# Patient Record
Sex: FEMALE | Race: WHITE | NOT HISPANIC OR LATINO | Employment: UNEMPLOYED | URBAN - METROPOLITAN AREA
[De-identification: names, ages, dates, MRNs, and addresses within clinical notes are randomized per-mention and may not be internally consistent; named-entity substitution may affect disease eponyms.]

---

## 2021-05-30 ENCOUNTER — RECORDS - HEALTHEAST (OUTPATIENT)
Dept: ADMINISTRATIVE | Facility: CLINIC | Age: 61
End: 2021-05-30

## 2021-05-31 ENCOUNTER — RECORDS - HEALTHEAST (OUTPATIENT)
Dept: ADMINISTRATIVE | Facility: CLINIC | Age: 61
End: 2021-05-31

## 2021-06-01 ENCOUNTER — RECORDS - HEALTHEAST (OUTPATIENT)
Dept: ADMINISTRATIVE | Facility: CLINIC | Age: 61
End: 2021-06-01

## 2021-06-02 ENCOUNTER — RECORDS - HEALTHEAST (OUTPATIENT)
Dept: ADMINISTRATIVE | Facility: CLINIC | Age: 61
End: 2021-06-02

## 2022-04-07 ENCOUNTER — MEDICAL CORRESPONDENCE (OUTPATIENT)
Dept: HEALTH INFORMATION MANAGEMENT | Facility: CLINIC | Age: 62
End: 2022-04-07
Payer: COMMERCIAL

## 2022-04-07 ENCOUNTER — TRANSFERRED RECORDS (OUTPATIENT)
Dept: HEALTH INFORMATION MANAGEMENT | Facility: CLINIC | Age: 62
End: 2022-04-07
Payer: COMMERCIAL

## 2022-04-08 ENCOUNTER — TRANSCRIBE ORDERS (OUTPATIENT)
Dept: OTHER | Age: 62
End: 2022-04-08
Payer: COMMERCIAL

## 2022-04-08 DIAGNOSIS — H53.10 SUBJECTIVE VISION DISTURBANCE, LEFT EYE: Primary | ICD-10-CM

## 2022-04-11 ENCOUNTER — TELEPHONE (OUTPATIENT)
Dept: OPHTHALMOLOGY | Facility: CLINIC | Age: 62
End: 2022-04-11
Payer: COMMERCIAL

## 2022-04-11 NOTE — TELEPHONE ENCOUNTER
Hey,     They are requesting Dr. Pugh to see this pt in one week     Notes were sent to you 4/8     Thanks     Victoria

## 2022-04-11 NOTE — TELEPHONE ENCOUNTER
[facility] Call Center    Phone Message    May a detailed message be left on voicemail: yes     Reason for Call: Appointment Intake    Referring Provider Name: Dr. Yen Hammond @ [facility]  Diagnosis and/or Symptoms: Subjective vision disturbance, left eye    Please advise     Action Taken: Message routed to:  [facility] ([facility] eye     Travel Screening: Not Applicable

## 2022-04-11 NOTE — TELEPHONE ENCOUNTER
Called and LVM for Ella fong make an appt with Dr. Pugh for Can do 4/21 at 7:45 am at [facility] with Dr. Keaton Cobos Communication Facilitator on 4/11/2022 at 12:52 PM

## 2022-04-12 ENCOUNTER — TELEPHONE (OUTPATIENT)
Dept: OPHTHALMOLOGY | Facility: CLINIC | Age: 62
End: 2022-04-12
Payer: COMMERCIAL

## 2022-04-12 NOTE — TELEPHONE ENCOUNTER
Ella called me and LVM confirming the date and time worked for her.     Made her an appointment with Dr. Pugh for 4/21 @ 745 am @ [facility]     Unable to reach Ella to enter her insurance, but provided the clinic address on her VM     Sent out a new pt packet with time, date of appointment     Victoria Cobos Communication Facilitator on 4/12/2022 at 9:21 AM

## 2022-04-12 NOTE — TELEPHONE ENCOUNTER
Called and LVM for Ella Jaramillo can make an appt with Dr. Pugh for Can do 4/21 at 7:45 am at [facility] with Dr. Pugh      Provided my number to confirm if that for  time, and date works     Victoria Cobos Communication Facilitator on 4/12/2022 at 8:44 AM

## 2022-04-21 ENCOUNTER — OFFICE VISIT (OUTPATIENT)
Dept: OPHTHALMOLOGY | Facility: CLINIC | Age: 62
End: 2022-04-21
Payer: COMMERCIAL

## 2022-04-21 ENCOUNTER — LAB (OUTPATIENT)
Dept: LAB | Facility: CLINIC | Age: 62
End: 2022-04-21

## 2022-04-21 DIAGNOSIS — H53.129 TRANSIENT VISUAL LOSS, UNSPECIFIED LATERALITY: Primary | ICD-10-CM

## 2022-04-21 DIAGNOSIS — G44.52 NEW DAILY PERSISTENT HEADACHE: ICD-10-CM

## 2022-04-21 DIAGNOSIS — H53.40 VISUAL FIELD DEFECT: ICD-10-CM

## 2022-04-21 DIAGNOSIS — H53.40 VISUAL FIELD DEFECT: Primary | ICD-10-CM

## 2022-04-21 LAB
BASOPHILS # BLD AUTO: 0 10E3/UL (ref 0–0.2)
BASOPHILS NFR BLD AUTO: 0 %
CRP SERPL-MCNC: 9.3 MG/L (ref 0–8)
EOSINOPHIL # BLD AUTO: 0.1 10E3/UL (ref 0–0.7)
EOSINOPHIL NFR BLD AUTO: 1 %
ERYTHROCYTE [DISTWIDTH] IN BLOOD BY AUTOMATED COUNT: 13.4 % (ref 10–15)
ERYTHROCYTE [SEDIMENTATION RATE] IN BLOOD BY WESTERGREN METHOD: 9 MM/HR (ref 0–30)
HCT VFR BLD AUTO: 44.3 % (ref 35–47)
HGB BLD-MCNC: 14.4 G/DL (ref 11.7–15.7)
IMM GRANULOCYTES # BLD: 0 10E3/UL
IMM GRANULOCYTES NFR BLD: 0 %
LYMPHOCYTES # BLD AUTO: 1.7 10E3/UL (ref 0.8–5.3)
LYMPHOCYTES NFR BLD AUTO: 20 %
MCH RBC QN AUTO: 27.4 PG (ref 26.5–33)
MCHC RBC AUTO-ENTMCNC: 32.5 G/DL (ref 31.5–36.5)
MCV RBC AUTO: 84 FL (ref 78–100)
MONOCYTES # BLD AUTO: 0.4 10E3/UL (ref 0–1.3)
MONOCYTES NFR BLD AUTO: 5 %
NEUTROPHILS # BLD AUTO: 6.2 10E3/UL (ref 1.6–8.3)
NEUTROPHILS NFR BLD AUTO: 74 %
NRBC # BLD AUTO: 0 10E3/UL
NRBC BLD AUTO-RTO: 0 /100
PLATELET # BLD AUTO: 286 10E3/UL (ref 150–450)
RBC # BLD AUTO: 5.26 10E6/UL (ref 3.8–5.2)
WBC # BLD AUTO: 8.4 10E3/UL (ref 4–11)

## 2022-04-21 PROCEDURE — 92083 EXTENDED VISUAL FIELD XM: CPT | Mod: 26 | Performed by: STUDENT IN AN ORGANIZED HEALTH CARE EDUCATION/TRAINING PROGRAM

## 2022-04-21 PROCEDURE — 99204 OFFICE O/P NEW MOD 45 MIN: CPT | Performed by: STUDENT IN AN ORGANIZED HEALTH CARE EDUCATION/TRAINING PROGRAM

## 2022-04-21 PROCEDURE — 85652 RBC SED RATE AUTOMATED: CPT | Performed by: PATHOLOGY

## 2022-04-21 PROCEDURE — 92083 EXTENDED VISUAL FIELD XM: CPT | Performed by: OPHTHALMOLOGY

## 2022-04-21 PROCEDURE — 85025 COMPLETE CBC W/AUTO DIFF WBC: CPT | Performed by: PATHOLOGY

## 2022-04-21 PROCEDURE — 36415 COLL VENOUS BLD VENIPUNCTURE: CPT | Performed by: PATHOLOGY

## 2022-04-21 PROCEDURE — G0463 HOSPITAL OUTPT CLINIC VISIT: HCPCS

## 2022-04-21 PROCEDURE — 92133 CPTRZD OPH DX IMG PST SGM ON: CPT | Mod: 26 | Performed by: STUDENT IN AN ORGANIZED HEALTH CARE EDUCATION/TRAINING PROGRAM

## 2022-04-21 PROCEDURE — 86140 C-REACTIVE PROTEIN: CPT | Performed by: PATHOLOGY

## 2022-04-21 PROCEDURE — 92133 CPTRZD OPH DX IMG PST SGM ON: CPT | Performed by: OPHTHALMOLOGY

## 2022-04-21 RX ORDER — LOSARTAN POTASSIUM 50 MG/1
1 TABLET ORAL DAILY
COMMUNITY
Start: 2022-04-11

## 2022-04-21 RX ORDER — ESCITALOPRAM OXALATE 10 MG/1
1 TABLET ORAL DAILY
COMMUNITY
Start: 2021-10-08

## 2022-04-21 RX ORDER — ATORVASTATIN CALCIUM 80 MG/1
1 TABLET, FILM COATED ORAL DAILY
COMMUNITY
Start: 2022-01-30

## 2022-04-21 RX ORDER — LORAZEPAM 1 MG/1
1 TABLET ORAL 2 TIMES DAILY
COMMUNITY
Start: 2022-01-12

## 2022-04-21 ASSESSMENT — PATIENT HEALTH QUESTIONNAIRE - PHQ9: SUM OF ALL RESPONSES TO PHQ QUESTIONS 1-9: 3

## 2022-04-21 ASSESSMENT — CONF VISUAL FIELD
OD_NORMAL: 1
OS_NORMAL: 1
METHOD: COUNTING FINGERS

## 2022-04-21 ASSESSMENT — VISUAL ACUITY
OD_CC: 20/20
OS_CC: 20/40
METHOD: SNELLEN - LINEAR
OS_CC: J1+
OD_CC: J1
OS_PH_CC: 20/25

## 2022-04-21 ASSESSMENT — REFRACTION_WEARINGRX
OD_CYLINDER: +0.50
OS_CYLINDER: +0.50
OS_AXIS: 177
OD_ADD: +2.25
OD_AXIS: 152
OS_SPHERE: +2.00
OS_ADD: +2.25
SPECS_TYPE: PAL
OD_SPHERE: +2.00

## 2022-04-21 ASSESSMENT — EXTERNAL EXAM - RIGHT EYE: OD_EXAM: NORMAL

## 2022-04-21 ASSESSMENT — TONOMETRY
OS_IOP_MMHG: 20
OD_IOP_MMHG: 20
IOP_METHOD: ICARE

## 2022-04-21 ASSESSMENT — CUP TO DISC RATIO
OS_RATIO: 0.6
OD_RATIO: 0.6

## 2022-04-21 ASSESSMENT — EXTERNAL EXAM - LEFT EYE: OS_EXAM: NORMAL

## 2022-04-21 ASSESSMENT — SLIT LAMP EXAM - LIDS
COMMENTS: NORMAL
COMMENTS: NORMAL

## 2022-04-21 NOTE — LETTER
2022         RE:  :  MRN: Ella Chiang  1960  [id number]     Dear Dr. Hammond,    Thank you for asking me to see your very pleasant patient, Ella Chiang, in neuro-ophthalmic consultation.  I would like to thank you for sending your records and I have summarized them in the history of present illness.  My assessment and plan are below.  For further details, please see my attached clinic note.      Assessment & Plan     Ella Chiang is a 61 year old female with the following diagnoses:   1. Transient visual loss, unspecified laterality    2. Visual field defect    3. New daily persistent headache       Patient was sent for consultation by Dr. Yen Hammond for transient visual obscuration.     HPI:  Patient is a 61-year-old female with a past medical history of hypertension who is presenting to the neuro-ophthalmology clinic with concerns for headache, and transient vision loss in the left eye.    She reports that since February of this year, she has been having significant headaches. She describes the headache as being frontal in nature, and prominent and occurring fairly constantly.  There is not anything specific that elicits the headache, or anything that helps with the headache.  She has these headache on a daily basis since onset.  Of note, she reports that earlier in February she experienced an episode of her left eye becoming dark throughout her whole visual field.  This lasted for 30 seconds and spontaneously remitted quickly / and sudden onset occurrence. She reports th.  She reports that this occurred in the setting of the headache that she was having. The headache is about the same in intensity since onset. She reports that back in October, she had a bad headache in the setting of COVID-19 infection, although this diminished.     Of note, reports a history of migraines, she does not feel like the current headaches are migrainous in nature. She has reported reported scintillating  lights sensation in the past with the prior migraines. She has not had this since she was much younger.      She was evaluated by outside provider, and an MRI of the brain and MRA was obtained without any acute intracranial abnormalities, although there were some nonspecific T2 hyperintensities identified.  Ultrasound of the carotid artery was obtained with mild plaque formation, and the identified anterograde  flow through the vertebral artery.  Echocardiogram was also obtained and was unremarkable.    She takes Advil which sometimes helps with the headache, although does not typically help all the time. She does feel more stressed than usual.  No recent trauma of the head. She reports that her feet on the soles and the sides sometimes are numb and tingle occasionally. She also has occasional hand and finger tingling, no weakness presently.    Of note she denies any diplopia, she sometimes have left-sided periorbital pain, no eye tearing with this.  And this does not typically happen with the headaches. She feels more fatigued than usual, although has no fevers or chills. Has stiff neck, no other joint pain, no muscle aches, no jaw claudication, no tenderness around her scalp. Reports weight has gone up gradually.     Has an appointment to see a neurologist soon coming up.     Independent historians:  Patient    Review of outside testing:  CRP 11/3/21 high at 4.62 (normal <0.50)    Echocardiogram 4/20/22:    Final Conclusion  1. Normal left ventricular chamber size. Calculated left ventricular ejection fraction (modified Huggins technique) is 61%.  2. Normal left ventricular wall thickness.  3. Cardiac valves were not well visualized but there is no hemodynamically significant stenosis or regurgitation identified by Doppler  Interrogation.    Ultrasound Carotid Duplex 4/13/22:    IMPRESSION:  1. Mild plaque formation, velocities consistent with less than 50% stenosis in the right internal carotid artery.  2. Mild  plaque formation, velocities consistent with less than 50% stenosis in the left internal carotid artery.  3. Flow within the vertebral arteries is antegrade.      MRI Brain 4/13/22:  EXAM: MR HEAD BRAIN WO  [facility]ATION: [facility]  DATE/TIME: 4/13/2022 4:26 PM    INDICATION: Headache Syndrome  COMPARISON: Head MRI 01/10/2008  TECHNIQUE: Routine multiplanar multisequence head MRI without intravenous contrast.    FINDINGS:  INTRACRANIAL CONTENTS: No acute or subacute infarct. No mass, acute hemorrhage, or extra-axial fluid collections. Scattered nonspecific foci of T2/FLAIR hyperintense signal in the cerebral white matter. Normal ventricles and sulci. Normal position of the cerebellar tonsils.     SELLA: No abnormality accounting for technique.    OSSEOUS STRUCTURES/SOFT TISSUES: Normal marrow signal. The major intracranial vascular flow voids are maintained.     ORBITS: No abnormality accounting for technique.     SINUSES/MASTOIDS: No paranasal sinus mucosal disease. No middle ear or mastoid effusion.   1. No acute intracranial process.  2.  A few nonspecific T2/FLAIR hyperintensities within the cerebral white matter may reflect minor sequela of previous microvascular ischemic or inflammatory insults and have also been described in the setting of chronic migraine headaches.       My interpretation performed today of outside testing: N/A    Review of outside clinical notes:  Dr. Yen Hammond clinic notes 4/7/2022.    Visual acuity: 20/20 OU    Past medical history:  Hyperlipidemia  Jaw pain/TMJ  Chronic pain syndrome  Headache  Anxiety/depression    Medications:   atorvastatin  escitalopram  LORazepam  losartan  omeprazole    Family history / social history:  - No family history of headaches or migraines.   - Has a history of smoking, quit 12 years ago.    Exam:  Visual acuity 20/20 right eye 20/25 left eye.  Color vision 11/11 right eye and 11/11 left eye.  Pupils within normal limits.  Intraocular pressure 20  right eye and 20 left eye.  Anterior segment exam normal.  Fundus exam normal.  Strabismus exam normal.  Good temporal artery pulses.     Tests ordered and interpreted today:  Visual field, 4/21/2022  - Right eye mean deviation equals -0.9, pattern standard deviation equals 1.5.  Reliable, 0% false positive or negative. Overall clean visual field without any specific deficits.    - Left eye false negative equals 22%, 0% false positive.  Mean deviation equals -0.6, pattern standard deviation equals 1.4, overall nonspecific visual field deficits.  OCT RNFL 4/21/2022  Right eye equals 88, intact nerve fiber layer, unremarkable  Left eye G equals 92, intact nerve fiber layer, unremarkable.    Discussion of management / interpretation with another provider: None    Assessment/Plan:   It is my impression that patient experienced transient vision loss LEFT eye once 2 months ago.  The timing of her episodes does not sound typical for Transient ischemic attack and has had negative stroke workup.  It would be more consistent with transient visual obscuration, which lasts seconds and has a sudden onset and offset as she experienced.  I would not pursue further work-up but suggested that she take a baby aspirin as prophylaxis just in case.      Patient has constant frontal headache, unremitting in nature.  No suggestion of focal neurologic deficits related to the headache.  Further evaluation with MRI scan was unremarkable for acute intracranial process, although identified some T2 hyperintensity which may be suggestive of chronic migrainous headache.  She did have an elevated CRP in November, but this antedated her current headache.  It is possible this was elevated due to to her COVID infection in October just prior to this test.  Negative Giant cell arteritis signs and symptoms currently.  I will obtain c-reactive protein, sedimentation rate, and complete blood count (CBC) and if markedly elevated, then will start prednisone  and get temporal artery biopsy. If normal or minimally elevated then will observe and wait for neurology consult.        Again, thank you for allowing me to participate in the care of your patient.      Sincerely,    Ger Pugh MD  Professor  Ophthalmology Residency   Director of Neuro-Ophthalmology  [employer] [facility] [employer] Endowed Chair  Departments of Ophthalmology, Neurology, and Neurosurgery  [facility] [address] [place]  [address]  T - [phone]  F - [phone]  [email or web address]      CC: Tierney Ramos NP  [facility]  [address]  [facility] [address]  Via Fax: [phone]     Yen Hammond OD  [facility]  [address] [facility] [address]  Via Fax: [phone]    DX = transient visual obscurations with normal optic nerve

## 2022-04-21 NOTE — PROGRESS NOTES
Assessment & Plan     Ella Chiang is a 61 year old female with the following diagnoses:   1. Transient visual loss, unspecified laterality    2. Visual field defect    3. New daily persistent headache       Patient was sent for consultation by Dr. Yen Hammond for transient visual obscuration.     HPI:  Patient is a 61-year-old female with a past medical history of hypertension who is presenting to the neuro-ophthalmology clinic with concerns for headache, and transient vision loss in the left eye.    She reports that since February of this year, she has been having significant headaches. She describes the headache as being frontal in nature, and prominent and occurring fairly constantly.  There is not anything specific that elicits the headache, or anything that helps with the headache.  She has these headache on a daily basis since onset.  Of note, she reports that earlier in February she experienced an episode of her left eye becoming dark throughout her whole visual field.  This lasted for 30 seconds and spontaneously remitted quickly / and sudden onset occurrence. She reports th.  She reports that this occurred in the setting of the headache that she was having. The headache is about the same in intensity since onset. She reports that back in October, she had a bad headache in the setting of COVID-19 infection, although this diminished.     Of note, reports a history of migraines, she does not feel like the current headaches are migrainous in nature. She has reported reported scintillating lights sensation in the past with the prior migraines. She has not had this since she was much younger.      She was evaluated by outside provider, and an MRI of the brain and MRA was obtained without any acute intracranial abnormalities, although there were some nonspecific T2 hyperintensities identified.  Ultrasound of the carotid artery was obtained with mild plaque formation, and the identified anterograde   flow through the vertebral artery.  Echocardiogram was also obtained and was unremarkable.    She takes Advil which sometimes helps with the headache, although does not typically help all the time. She does feel more stressed than usual.  No recent trauma of the head. She reports that her feet on the soles and the sides sometimes are numb and tingle occasionally. She also has occasional hand and finger tingling, no weakness presently.    Of note she denies any diplopia, she sometimes have left-sided periorbital pain, no eye tearing with this.  And this does not typically happen with the headaches. She feels more fatigued than usual, although has no fevers or chills. Has stiff neck, no other joint pain, no muscle aches, no jaw claudication, no tenderness around her scalp. Reports weight has gone up gradually.     Has an appointment to see a neurologist soon coming up.     Independent historians:  Patient    Review of outside testing:  CRP 11/3/21 high at 4.62 (normal <0.50)    Echocardiogram 4/20/22:    Final Conclusion  1. Normal left ventricular chamber size. Calculated left ventricular ejection fraction (modified Huggins technique) is 61%.  2. Normal left ventricular wall thickness.  3. Cardiac valves were not well visualized but there is no hemodynamically significant stenosis or regurgitation identified by Doppler  Interrogation.    Ultrasound Carotid Duplex 4/13/22:    IMPRESSION:  1. Mild plaque formation, velocities consistent with less than 50% stenosis in the right internal carotid artery.  2. Mild plaque formation, velocities consistent with less than 50% stenosis in the left internal carotid artery.  3. Flow within the vertebral arteries is antegrade.      MRI Brain 4/13/22:  EXAM: MR HEAD BRAIN WO  LOCATION: [facility]  DATE/TIME: 4/13/2022 4:26 PM    INDICATION: Headache Syndrome  COMPARISON: Head MRI 01/10/2008  TECHNIQUE: Routine multiplanar multisequence head MRI without intravenous  contrast.    FINDINGS:  INTRACRANIAL CONTENTS: No acute or subacute infarct. No mass, acute hemorrhage, or extra-axial fluid collections. Scattered nonspecific foci of T2/FLAIR hyperintense signal in the cerebral white matter. Normal ventricles and sulci. Normal position of the cerebellar tonsils.     SELLA: No abnormality accounting for technique.    OSSEOUS STRUCTURES/SOFT TISSUES: Normal marrow signal. The major intracranial vascular flow voids are maintained.     ORBITS: No abnormality accounting for technique.     SINUSES/MASTOIDS: No paranasal sinus mucosal disease. No middle ear or mastoid effusion.   1. No acute intracranial process.  2.  A few nonspecific T2/FLAIR hyperintensities within the cerebral white matter may reflect minor sequela of previous microvascular ischemic or inflammatory insults and have also been described in the setting of chronic migraine headaches.       My interpretation performed today of outside testing:  N/A    Review of outside clinical notes:  Dr. Yen Hammond clinic notes 4/7/2022.    Visual acuity: 20/20 OU    Past medical history:  Hyperlipidemia  Jaw pain/TMJ  Chronic pain syndrome  Headache  Anxiety/depression    Medications:   atorvastatin  escitalopram  LORazepam  losartan  omeprazole    Family history / social history:  - No family history of headaches or migraines.   - Has a history of smoking, quit 12 years ago.    Exam:  Visual acuity 20/20 right eye 20/25 left eye.  Color vision 11/11 right eye and 11/11 left eye.  Pupils within normal limits.  Intraocular pressure 20 right eye and 20 left eye.  Anterior segment exam normal.  Fundus exam normal.  Strabismus exam normal.  Good temporal artery pulses.     Tests ordered and interpreted today:  Visual field, 4/21/2022  - Right eye mean deviation equals -0.9, pattern standard deviation equals 1.5.  Reliable, 0% false positive or negative. Overall clean visual field without any specific deficits.    - Left eye false  negative equals 22%, 0% false positive.  Mean deviation equals -0.6, pattern standard deviation equals 1.4, overall nonspecific visual field deficits.    OCT RNFL 4/21/2022  Right eye equals 88, intact nerve fiber layer, unremarkable  Left eye G equals 92, intact nerve fiber layer, unremarkable.    Discussion of management / interpretation with another provider:   None    Assessment/Plan:   It is my impression that patient experienced transient vision loss LEFT eye once 2 months ago.  The timing of her episodes does not sound typical for Transient ischemic attack and has had negative stroke workup.  It would be more consistent with transient visual obscuration, which lasts seconds and has a sudden onset and offset as she experienced.  I would not pursue further work-up but suggested that she take a baby aspirin as prophylaxis just in case.      Patient has constant frontal headache, unremitting in nature.  No suggestion of focal neurologic deficits related to the headache.  Further evaluation with MRI scan was unremarkable for acute intracranial process, although identified some T2 hyperintensity which may be suggestive of chronic migrainous headache.  She did have an elevated CRP in November, but this antedated her current headache.  It is possible this was elevated due to to her COVID infection in October just prior to this test.  Negative Giant cell arteritis signs and symptoms currently.  I will obtain c-reactive protein, sedimentation rate, and complete blood count (CBC) and if markedly elevated, then will start prednisone and get temporal artery biopsy. If normal or minimally elevated then will observe and wait for neurology consult.            Attending Physician Attestation:  Complete documentation of historical and exam elements from today's encounter can be found in the full encounter summary report (not reduplicated in this progress note).  I personally obtained the chief complaint(s) and history of  present illness.  I confirmed and edited as necessary the review of systems, past medical/surgical history, family history, social history, and examination findings as documented by others; and I examined the patient myself.  I personally reviewed the relevant tests, images, and reports as documented above.  I formulated and edited as necessary the assessment and plan and discussed the findings and management plan with the patient and family. I personally reviewed the ophthalmic test(s) associated with this encounter, agree with the interpretation(s) as documented by the resident/fellow, and have edited the corresponding report(s) as necessary.  - Ger Fields MD - PGY3  Department of Ophthalmology  Pager: [phone]

## 2022-04-25 ENCOUNTER — TELEPHONE (OUTPATIENT)
Dept: OPHTHALMOLOGY | Facility: CLINIC | Age: 62
End: 2022-04-25
Payer: COMMERCIAL

## 2022-04-25 NOTE — TELEPHONE ENCOUNTER
Left voicemail regarding below.     Lacey Wesley on 4/25/2022 at 2:31 PM    ----- Message from Ger Pugh MD sent at 4/21/2022 12:49 PM CDT -----  Dear Ella    Your results were within the acceptable range.  I would not recommend starting prednisone or getting that biopsy that we talked about.  Please keep your appointment with the neurologist to discuss your headache.     Please call my office right away if your symptoms worsen.       Thank you for allowing me to share in your care.     Ger Pugh MD  4/21/2022  12:49 PM

## 2023-01-16 ENCOUNTER — APPOINTMENT (OUTPATIENT)
Dept: RADIOLOGY | Facility: CLINIC | Age: 63
End: 2023-01-16
Attending: EMERGENCY MEDICINE
Payer: COMMERCIAL

## 2023-01-16 ENCOUNTER — APPOINTMENT (OUTPATIENT)
Dept: ULTRASOUND IMAGING | Facility: CLINIC | Age: 63
End: 2023-01-16
Attending: EMERGENCY MEDICINE
Payer: COMMERCIAL

## 2023-01-16 ENCOUNTER — HOSPITAL ENCOUNTER (EMERGENCY)
Facility: CLINIC | Age: 63
Discharge: HOME | End: 2023-01-16
Attending: EMERGENCY MEDICINE | Admitting: EMERGENCY MEDICINE
Payer: COMMERCIAL

## 2023-01-16 VITALS
OXYGEN SATURATION: 98 % | TEMPERATURE: 98.2 F | WEIGHT: 190 LBS | SYSTOLIC BLOOD PRESSURE: 153 MMHG | HEART RATE: 72 BPM | DIASTOLIC BLOOD PRESSURE: 74 MMHG | HEIGHT: 63 IN | RESPIRATION RATE: 16 BRPM | BODY MASS INDEX: 33.66 KG/M2

## 2023-01-16 DIAGNOSIS — R94.31 LONG QT INTERVAL: ICD-10-CM

## 2023-01-16 DIAGNOSIS — R07.9 CHEST PAIN: ICD-10-CM

## 2023-01-16 DIAGNOSIS — R06.02 SHORTNESS OF BREATH: ICD-10-CM

## 2023-01-16 LAB
ALBUMIN SERPL-MCNC: 4.5 G/DL (ref 3.5–5)
ALP SERPL-CCNC: 139 U/L (ref 45–120)
ALT SERPL W P-5'-P-CCNC: 53 U/L (ref 0–45)
ANION GAP SERPL CALCULATED.3IONS-SCNC: 10 MMOL/L (ref 5–18)
AST SERPL W P-5'-P-CCNC: 43 U/L (ref 0–40)
ATRIAL RATE - MUSE: 82 BPM
BASOPHILS # BLD AUTO: 0 10E3/UL (ref 0–0.2)
BASOPHILS NFR BLD AUTO: 0 %
BILIRUB SERPL-MCNC: 0.5 MG/DL (ref 0–1)
BUN SERPL-MCNC: 19 MG/DL (ref 8–22)
CALCIUM SERPL-MCNC: 9.9 MG/DL (ref 8.5–10.5)
CHLORIDE BLD-SCNC: 104 MMOL/L (ref 98–107)
CO2 SERPL-SCNC: 28 MMOL/L (ref 22–31)
CREAT SERPL-MCNC: 0.83 MG/DL (ref 0.6–1.1)
D DIMER PPP FEU-MCNC: 0.37 UG/ML FEU (ref 0–0.5)
DIASTOLIC BLOOD PRESSURE - MUSE: NORMAL MMHG
EOSINOPHIL # BLD AUTO: 0.1 10E3/UL (ref 0–0.7)
EOSINOPHIL NFR BLD AUTO: 2 %
ERYTHROCYTE [DISTWIDTH] IN BLOOD BY AUTOMATED COUNT: 13.4 % (ref 10–15)
GFR SERPL CREATININE-BSD FRML MDRD: 79 ML/MIN/1.73M2
GLUCOSE BLD-MCNC: 92 MG/DL (ref 70–125)
HCT VFR BLD AUTO: 39.7 % (ref 35–47)
HGB BLD-MCNC: 13 G/DL (ref 11.7–15.7)
HOLD SPECIMEN: NORMAL
IMM GRANULOCYTES # BLD: 0 10E3/UL
IMM GRANULOCYTES NFR BLD: 0 %
INTERPRETATION ECG - MUSE: NORMAL
LIPASE SERPL-CCNC: 46 U/L (ref 0–52)
LYMPHOCYTES # BLD AUTO: 1.9 10E3/UL (ref 0.8–5.3)
LYMPHOCYTES NFR BLD AUTO: 31 %
MAGNESIUM SERPL-MCNC: 1.9 MG/DL (ref 1.8–2.6)
MCH RBC QN AUTO: 27.9 PG (ref 26.5–33)
MCHC RBC AUTO-ENTMCNC: 32.7 G/DL (ref 31.5–36.5)
MCV RBC AUTO: 85 FL (ref 78–100)
MONOCYTES # BLD AUTO: 0.3 10E3/UL (ref 0–1.3)
MONOCYTES NFR BLD AUTO: 6 %
NEUTROPHILS # BLD AUTO: 3.8 10E3/UL (ref 1.6–8.3)
NEUTROPHILS NFR BLD AUTO: 61 %
NRBC # BLD AUTO: 0 10E3/UL
NRBC BLD AUTO-RTO: 0 /100
P AXIS - MUSE: 77 DEGREES
PLATELET # BLD AUTO: 250 10E3/UL (ref 150–450)
POTASSIUM BLD-SCNC: 3.6 MMOL/L (ref 3.5–5)
PR INTERVAL - MUSE: 160 MS
PROT SERPL-MCNC: 7.6 G/DL (ref 6–8)
QRS DURATION - MUSE: 84 MS
QT - MUSE: 468 MS
QTC - MUSE: 546 MS
R AXIS - MUSE: 44 DEGREES
RBC # BLD AUTO: 4.66 10E6/UL (ref 3.8–5.2)
SODIUM SERPL-SCNC: 142 MMOL/L (ref 136–145)
SYSTOLIC BLOOD PRESSURE - MUSE: NORMAL MMHG
T AXIS - MUSE: 76 DEGREES
TROPONIN I SERPL-MCNC: <0.01 NG/ML (ref 0–0.29)
TSH SERPL DL<=0.005 MIU/L-ACNC: 2.46 UIU/ML (ref 0.3–5)
VENTRICULAR RATE- MUSE: 82 BPM
WBC # BLD AUTO: 6.2 10E3/UL (ref 4–11)

## 2023-01-16 PROCEDURE — 80053 COMPREHEN METABOLIC PANEL: CPT | Performed by: EMERGENCY MEDICINE

## 2023-01-16 PROCEDURE — 93005 ELECTROCARDIOGRAM TRACING: CPT | Performed by: EMERGENCY MEDICINE

## 2023-01-16 PROCEDURE — 96374 THER/PROPH/DIAG INJ IV PUSH: CPT

## 2023-01-16 PROCEDURE — 36415 COLL VENOUS BLD VENIPUNCTURE: CPT | Performed by: EMERGENCY MEDICINE

## 2023-01-16 PROCEDURE — 84484 ASSAY OF TROPONIN QUANT: CPT | Performed by: EMERGENCY MEDICINE

## 2023-01-16 PROCEDURE — 83735 ASSAY OF MAGNESIUM: CPT | Performed by: EMERGENCY MEDICINE

## 2023-01-16 PROCEDURE — 85379 FIBRIN DEGRADATION QUANT: CPT | Performed by: EMERGENCY MEDICINE

## 2023-01-16 PROCEDURE — 71046 X-RAY EXAM CHEST 2 VIEWS: CPT

## 2023-01-16 PROCEDURE — 250N000013 HC RX MED GY IP 250 OP 250 PS 637: Performed by: EMERGENCY MEDICINE

## 2023-01-16 PROCEDURE — 99285 EMERGENCY DEPT VISIT HI MDM: CPT | Mod: 25

## 2023-01-16 PROCEDURE — 250N000011 HC RX IP 250 OP 636: Performed by: EMERGENCY MEDICINE

## 2023-01-16 PROCEDURE — 84443 ASSAY THYROID STIM HORMONE: CPT | Performed by: EMERGENCY MEDICINE

## 2023-01-16 PROCEDURE — 83690 ASSAY OF LIPASE: CPT | Performed by: EMERGENCY MEDICINE

## 2023-01-16 PROCEDURE — 250N000009 HC RX 250: Performed by: EMERGENCY MEDICINE

## 2023-01-16 PROCEDURE — 76705 ECHO EXAM OF ABDOMEN: CPT

## 2023-01-16 PROCEDURE — 85025 COMPLETE CBC W/AUTO DIFF WBC: CPT | Performed by: EMERGENCY MEDICINE

## 2023-01-16 RX ORDER — KETOROLAC TROMETHAMINE 15 MG/ML
15 INJECTION, SOLUTION INTRAMUSCULAR; INTRAVENOUS ONCE
Status: COMPLETED | OUTPATIENT
Start: 2023-01-16 | End: 2023-01-16

## 2023-01-16 RX ADMIN — ALUMINUM HYDROXIDE, MAGNESIUM HYDROXIDE, AND DIMETHICONE 30 ML: 200; 20; 200 SUSPENSION ORAL at 16:43

## 2023-01-16 RX ADMIN — KETOROLAC TROMETHAMINE 15 MG: 15 INJECTION, SOLUTION INTRAMUSCULAR; INTRAVENOUS at 16:43

## 2023-01-16 ASSESSMENT — ENCOUNTER SYMPTOMS
ABDOMINAL PAIN: 1
BLOOD IN STOOL: 0
CHILLS: 0
VOMITING: 0
NAUSEA: 0
DIARRHEA: 0
FEVER: 0
COUGH: 1
SHORTNESS OF BREATH: 1

## 2023-01-16 ASSESSMENT — ACTIVITIES OF DAILY LIVING (ADL)
ADLS_ACUITY_SCORE: 35
ADLS_ACUITY_SCORE: 35

## 2023-01-16 NOTE — ED PROVIDER NOTES
History:  I have seen the patient with Kathleen Lincoln PA-C and agree with their assessment, physical, and plan.  I was present for key parts of the physical and any procedures performed.  I personally saw the patient and performed a substantive portion of the visit including all aspects of the medical decision making.  My additional impression on seeing the patient is that Ella Chiang is a 62 year old who presents with chest pain and blood pressure concerns. The patient reports that she was sent here from [facility] due to high blood pressures. She has been having chest pain that radiates to her back for the last 2 months, worsening last week. She describes it as a pressure sensation and it is associated with increased shortness of breath.    Medical Decision Making:  ED Course as of 01/16/23 2153   Mon Jan 16, 2023   1531 Ella chan chest discomfort and back discomfort have been coming on now for the last couple of months.  It does not seem to be associated with exercise or particular time of day.  She has had significant problems with some acid reflux previously and that was the reason for her going in for evaluation today.  Nothing was changed about it today so despite the fact that she has elevated blood pressure I think this sounds unlikely to be dissection.  Certainly will look for any signs of heart damage but again the history is somewhat atypical for this being discomfort related to the heart.  However, she has been more short of breath over the last couple of months and so I did discuss with her that she certainly does sound like she needs to get a complete heart evaluation done and that if our work-up today were normal this could be done as an outpatient.  She would prefer to do it through her primary care clinic as she has an appointment to see them this coming Friday which would be in 4 days.  Since she has not had any acute change in her symptoms over the last week I think that certainly an  appropriate timeframe.  She will discuss with them what sort of heart imaging to do for her persistent shortness of breath that is new over the last couple of months.  Our plan was to do a D-dimer and if that was negative could do regular chest x-ray imaging.  Her D-dimer today is negative so we will do a chest x-ray to look for any anatomic abnormalities in her chest that could be causing both the discomfort and the shortness of breath.  Otherwise she did have a CAT scan done in 2010 that I do not have access to the results of.  Otherwise consider gallbladder disease, pancreatitis, ulcer versus gastritis and GERD, less likely adrenal but we are doing an ultrasound.   2149 Her lft's were slightly off so Kathleen ordered an ultrasound.  That was unremarkable and she was discharged to follow up as an outpatient with [facility] as planned as the next step in the evaluation of this.       Medications:  Medications   ketorolac (TORADOL) injection 15 mg (15 mg Intravenous Given 1/16/23 1643)   lidocaine (viscous) (XYLOCAINE) 2 % 15 mL, alum & mag hydroxide-simethicone (MAALOX) 15 mL GI Cocktail (30 mLs Oral Given 1/16/23 1643)       Additional ROS:  .  All other systems are otherwise negative.    Additional PE: Constitutional:   In nad sitting semi reclined in a hallway bed   HENT:  Normocephalic, posterior pharynx wnl, mucous membranes moist and dark pink   Eyes:  PERRL, EOMI, Conjunctiva normal, No discharge, no scleral icterus.  Respiratory:  Breathing easily, cta  Cardiovascular:  rrr nl s1s2 0 murmurs, rubs, or gallops.  Peripheral pulses dp, pt, and radial are wnl.  no peripheral edema   GI:  Bowel sounds normal, Soft, No tenderness, No flank tenderness, nondistended.  Describes epigastric area pain but not ttp.  :No CVA tenderness.   Musculoskeletal:  Moves all extremities.  No erythematous or swollen major joints,   Integument:  Normal color  Lymphatic:  No cervical lymphadenopathy  Neurologic:  Alert & oriented x 3,  Normal motor function, Normal sensory function, No focal deficits noted. Normal speech.  Psychiatric:  Affect normal, Judgment normal, Mood normal.     Results for orders placed or performed during the hospital encounter of 01/16/23   Chest XR,  PA & LAT     Status: None    Narrative    EXAM: XR CHEST 2 VIEWS  LOCATION: [facility]  DATE/TIME: 1/16/2023 4:18 PM    INDICATION: Chest pain  COMPARISON: 07/04/2022      Impression    IMPRESSION: Negative chest with no significant change.   Abdomen US, limited (RUQ only)     Status: None    Narrative    EXAM: US ABDOMEN LIMITED  LOCATION: [facility]  DATE/TIME: 1/16/2023 3:41 PM    INDICATION: mildly elevated LFTs, CP  COMPARISON: CT 10/23/2017  TECHNIQUE: Limited abdominal ultrasound.    FINDINGS:    GALLBLADDER: Surgically removed.    BILE DUCTS: No biliary dilatation. The common duct measures 11 mm. Prominent likely due to previous cholecystectomy. Similar to prior CT.    LIVER: Severe diffuse hepatic steatosis. No focal mass.    RIGHT KIDNEY: No hydronephrosis.    PANCREAS: The visualized portions are normal.    No ascites.      Impression    IMPRESSION:  1.  Gallbladder surgically removed.    2.  Prominent bile ducts likely from previous cholecystectomy reservoir effect similar to CT 2017.    3.  Marked diffuse hepatic steatosis.       Comprehensive metabolic panel     Status: Abnormal   Result Value Ref Range    Sodium 142 136 - 145 mmol/L    Potassium 3.6 3.5 - 5.0 mmol/L    Chloride 104 98 - 107 mmol/L    Carbon Dioxide (CO2) 28 22 - 31 mmol/L    Anion Gap 10 5 - 18 mmol/L    Urea Nitrogen 19 8 - 22 mg/dL    Creatinine 0.83 0.60 - 1.10 mg/dL    Calcium 9.9 8.5 - 10.5 mg/dL    Glucose 92 70 - 125 mg/dL    Alkaline Phosphatase 139 (H) 45 - 120 U/L    AST 43 (H) 0 - 40 U/L    ALT 53 (H) 0 - 45 U/L    Protein Total 7.6 6.0 - 8.0 g/dL    Albumin 4.5 3.5 - 5.0 g/dL    Bilirubin Total 0.5 0.0 - 1.0 mg/dL    GFR Estimate  79 >60 mL/min/1.73m2   Lipase     Status: Normal   Result Value Ref Range    Lipase 46 0 - 52 U/L   Troponin I     Status: Normal   Result Value Ref Range    Troponin I <0.01 0.00 - 0.29 ng/mL   D dimer quantitative     Status: Normal   Result Value Ref Range    D-Dimer Quantitative 0.37 0.00 - 0.50 ug/mL FEU    Narrative    This D-dimer assay is intended for use in conjunction with a clinical pretest probability assessment model to exclude pulmonary embolism (PE) and deep venous thrombosis (DVT) in outpatients suspected of PE or DVT. The cut-off value is 0.50 ug/mL FEU.   TSH with free T4 reflex     Status: Normal   Result Value Ref Range    TSH 2.46 0.30 - 5.00 uIU/mL   Magnesium     Status: Normal   Result Value Ref Range    Magnesium 1.9 1.8 - 2.6 mg/dL   CBC with platelets and differential     Status: None   Result Value Ref Range    WBC Count 6.2 4.0 - 11.0 10e3/uL    RBC Count 4.66 3.80 - 5.20 10e6/uL    Hemoglobin 13.0 11.7 - 15.7 g/dL    Hematocrit 39.7 35.0 - 47.0 %    MCV 85 78 - 100 fL    MCH 27.9 26.5 - 33.0 pg    MCHC 32.7 31.5 - 36.5 g/dL    RDW 13.4 10.0 - 15.0 %    Platelet Count 250 150 - 450 10e3/uL    % Neutrophils 61 %    % Lymphocytes 31 %    % Monocytes 6 %    % Eosinophils 2 %    % Basophils 0 %    % Immature Granulocytes 0 %    NRBCs per 100 WBC 0 <1 /100    Absolute Neutrophils 3.8 1.6 - 8.3 10e3/uL    Absolute Lymphocytes 1.9 0.8 - 5.3 10e3/uL    Absolute Monocytes 0.3 0.0 - 1.3 10e3/uL    Absolute Eosinophils 0.1 0.0 - 0.7 10e3/uL    Absolute Basophils 0.0 0.0 - 0.2 10e3/uL    Absolute Immature Granulocytes 0.0 <=0.4 10e3/uL    Absolute NRBCs 0.0 10e3/uL   Extra Tube     Status: None    Narrative    The following orders were created for panel order Extra Tube.  Procedure                               Abnormality         Status                     ---------                               -----------         ------                     Extra Green Top (Lithium...[[id number]]                       Final result                 Please view results for these tests on the individual orders.   Extra Green Top (Lithium Heparin) Tube     Status: None   Result Value Ref Range    Hold Specimen [facility]    ECG 12-LEAD WITH MUSE (LHE)     Status: None   Result Value Ref Range    Systolic Blood Pressure  mmHg    Diastolic Blood Pressure  mmHg    Ventricular Rate 82 BPM    Atrial Rate 82 BPM    NJ Interval 160 ms    QRS Duration 84 ms     ms    QTc 546 ms    P Axis 77 degrees    R AXIS 44 degrees    T Axis 76 degrees    Interpretation ECG       Sinus rhythm  Nonspecific ST and T wave abnormality  Prolonged QT  Abnormal ECG  When compared with ECG of 05-APR-2010 07:52,  T wave inversion now evident in Lateral leads  QT has lengthened  Confirmed by SEE ED PROVIDER NOTE FOR, ECG INTERPRETATION ([id number]),  [place] ([id number]) on 1/16/2023 1:20:32 PM     CBC with platelets differential     Status: None    Narrative    The following orders were created for panel order CBC with platelets differential.  Procedure                               Abnormality         Status                     ---------                               -----------         ------                     CBC with platelets and d...[[id number]]                      Final result                 Please view results for these tests on the individual orders.         EKG:  Performed at:  1223 (and I reviewed on my arrival to the shift at 1430 when received consult)  Impression:  nsr rate of 82, nonspecific st findings may be related to wandering baseline.  Possible lae.  qtc 546 and prolonged.  Compared to 4/5/10 qtc has lengthened.  Pr 160ms, qrs 84ms, qtc 546ms, prt axes 77 44 76.    I have independently reviewed and interpreted the EKG(s) documented above.          Diagnosis:    1. Shortness of breath    2. Chest pain    3. Long QT interval           IBruna, am serving as a scribe to document services personally performed by Adri Infante MD.,  based on my observations and the provider's statements to me.  I, Adri Infante MD., attest that Bruna Mckeon is acting in a scribe capacity, has observed my performance of the services and has documented them in accordance with my direction.         Adri Infante MD  01/16/23 1726

## 2023-01-16 NOTE — ED PROVIDER NOTES
EMERGENCY DEPARTMENT ENCOUNTER      NAME: Ella Chiang  AGE: 62 year old female  YOB: 1960  MRN: [id number]  EVALUATION DATE & TIME: 1/16/2023  1:32 PM    PCP: Christina Tanner    ED PROVIDER: Kathleen Lincoln PA-C      Chief Complaint   Patient presents with     Chest Pain         FINAL IMPRESSION:  1. Shortness of breath    2. Chest pain          ED COURSE & MEDICAL DECISION MAKING:    Pertinent Labs & Imaging studies reviewed. (See chart for details)    62 year old female presents to the Emergency Department for evaluation of chest pain.    Physical exam is remarkable for a well-appearing female who is in no acute distress.  Heart and lung sounds are clear diffusely throughout.  Abdomen is soft and nontender.  Vital signs remarkable for mild hypertension but otherwise stable and she is afebrile.    CBC is unremarkable with no leukocytosis or anemia.  CMP is unremarkable with no significant electrolyte derangements, normal kidney function.  Very mild elevations in LFTs with AST of 43 and ALT of 53, normal bilirubin.  Lipase within normal limits.  Magnesium within normal limits.  D-dimer is negative. TSH within normal limits. Troponin is negative, EKG without acute ischemic changes; prolonged QT noted.  Chest x-ray unremarkable.  Given mildly elevated LFTs, right upper quadrant ultrasound was obtained which is unremarkable.    The patient was given Toradol and GI cocktail for treatment of her symptoms.  I do not think any further emergent labs or imaging are indicated at this time.  The patient has been having ongoing symptoms for months, she has a heart score of 2 so I do not think inpatient cardiac rule out is indicated; I think a single troponin and nonischemic EKG adequately rules out ACS at this time.  Patient had a nuclear cardiac stress test less than a year ago which was negative.  She is low risk for PE by Wells criteria with negative D-dimer.  Presentation not consistent with aortic  dissection and her blood pressures were actually fine here other than mildly elevated hypertension.  Advised patient to continue following with GI and her primary care provider regarding her symptoms, recommend return here for any new or worsening symptoms.  Patient is agreeable with this treatment plan and verbalized her understanding.  Patient's care discussed with staff physician Dr. Adri Infante who is in agreement with the treatment plan.    Medical Decision Making    Supplemental history from: Documented in chart, if applicable    External Record(s) Reviewed: Outpatient Record: Clinic visits and Documented in HPI, if applicable.    Differential Diagnosis: See MDM charting for differential considered.     I performed an independent interpretation of the: EKG: See my documentation.    Discussed with radiology regarding test interpretation: N/A    Discussion of management with another provider: Documented in chart, if applicable    The following testing was considered but ultimately not selected: None    I considered prescription management with: N/A    The patient's care impacted: Hypertension    Consideration of Admission/Observation: Yes but deferred after reassuring workup.    Care significantly affected by Social Determinants of Health including: N/A    ED Course   2:01 PM Performed my initial history and physical exam. Discussed workup in the emergency department, management of symptoms, and likely disposition.   2:10 PM Staffed with Dr. Adri Infante.   3:09 PM Updated with test results and discussed adding on US.   4:28 PM Updated with test results. I discussed the plan for discharge with the patient or family and they are agreeable.. We discussed supportive cares at home and reasons for return to the ER including new or worsening symptoms - all questions and concerns addressed. Patient to be discharged by RN.    At the conclusion of the encounter I discussed the results of all of the tests and the  disposition. The questions were answered. The patient or family acknowledged understanding and was agreeable with the care plan.     Voice recognition software was used in the creation of this note. Any grammatical or nonsensical errors are due to inherent errors with the software and are not the intention of the writer.     MEDICATIONS GIVEN IN THE EMERGENCY:  Medications   ketorolac (TORADOL) injection 15 mg (15 mg Intravenous Given 1/16/23 1643)   lidocaine (viscous) (XYLOCAINE) 2 % 15 mL, alum & mag hydroxide-simethicone (MAALOX) 15 mL GI Cocktail (30 mLs Oral Given 1/16/23 1643)       NEW PRESCRIPTIONS STARTED AT TODAY'S ER VISIT  New Prescriptions    No medications on file            =================================================================    HPI    Patient information was obtained from: Patient    Use of : N/A       Ella Chiang is a 62 year old female with PMH of chronic pain syndrome, nicotine dependence, HLD who presents to the ED via walk-in for evaluation of blood pressure concerns.     The patient reports that she was sent here from [place] [facility] due to high blood pressures.  She has been having chest pain that radiates to her back for the last 2 months, it has been worse the last week.  She describes it as a pressure sensation and it is associated with increased shortness of breath.  She notes she becomes short of breath even with a small amount of exertion and this is abnormal for her, she is usually very active and does multiple types of workouts and walks.  She attributes these symptoms to her acid reflux but notes that they do seem to be associated with high blood pressure as well.  She also has pain in both of her legs with activities.    She denies any fevers, chills, black or bloody stools, hemoptysis, new extremity or leg swelling.    She reports no personal cardiac history, no history of DVT or PE, no recent long travel or surgeries, no estrogen containing  medications. Cardiac MRI/nuclear stress test performed in  which was noted to be normal.      REVIEW OF SYSTEMS   Review of Systems   Constitutional: Negative for chills and fever.   Respiratory: Positive for cough and shortness of breath.    Cardiovascular: Positive for chest pain. Negative for leg swelling.   Gastrointestinal: Positive for abdominal pain. Negative for blood in stool, diarrhea, nausea and vomiting.       All other systems reviewed and are negative unless noted in HPI.      PAST MEDICAL HISTORY:  Past Medical History:   Diagnosis Date     Hypertension        PAST SURGICAL HISTORY:  Past Surgical History:   Procedure Laterality Date     IR LUMBAR EPIDURAL STEROID INJECTION  2010     GA VAGINAL HYSTERECTOMY,UTERUS 250 GMS/<      Description: Vaginal Hysterectomy;  Recorded: 2010;       CURRENT MEDICATIONS:    atorvastatin (LIPITOR) 80 MG tablet  escitalopram (LEXAPRO) 10 MG tablet  LORazepam (ATIVAN) 1 MG tablet  losartan (COZAAR) 50 MG tablet  omeprazole (PRILOSEC) 20 MG DR capsule        ALLERGIES:  Allergies   Allergen Reactions     Gabapentin Unknown     Nortriptyline Unknown     Simvastatin Unknown     Sulfa (Sulfonamide Antibiotics) [Sulfa Drugs] Unknown       FAMILY HISTORY:  Family History   Problem Relation Age of Onset     Hypertension Mother      Cancer Mother      Cancer Father      Cancer Brother      Cancer Sister        SOCIAL HISTORY:   Social History     Socioeconomic History     Marital status:    Tobacco Use     Smoking status: Former     Packs/day: 0.50     Types: Cigarettes     Quit date: 2010     Years since quittin.7     Smokeless tobacco: Never   Substance and Sexual Activity     Alcohol use: Never     Drug use: Never       VITALS:  Patient Vitals for the past 24 hrs:   BP Temp Temp src Pulse Resp SpO2 Height Weight   23 1434 -- -- -- 72 16 98 % -- --   23 1430 (!) 153/74 -- -- -- -- -- -- --   23 1224 (!) 144/90 98.2  F (36.8  " C) Temporal 84 18 97 % 1.6 m (5' 3\") 86.2 kg (190 lb)       PHYSICAL EXAM    VITAL SIGNS: BP (!) 153/74   Pulse 72   Temp 98.2  F (36.8  C) (Temporal)   Resp 16   Ht 1.6 m (5' 3\")   Wt 86.2 kg (190 lb)   SpO2 98%   BMI 33.66 kg/m    General Appearance: Alert, cooperative, normal speech and facial symmetry, appears stated age, the patient does not appear in distress  Head:  Normocephalic, without obvious abnormality, atraumatic  Cardio:  Regular rate and rhythm, S1 and S2 normal, no murmur, rub    or gallop, 2+ pulses symmetric in all extremities  Pulm:  Clear to auscultation bilaterally, respirations unlabored with no accessory muscle use  Abdomen:  Abdomen is soft, non-distended with no tenderness to palpation, rebound tenderness, or guarding.   Extremities:  Extremities normal, there is no tenderness to palpation, atraumatic, no cyanosis or edema, full function and range of motion, pulses equal in all extremities, normal cap refill, no joint swelling  Skin: Skin is warm and dry, no rashes or wounds  Neuro: Patient is awake, alert, and responsive to voice. No gross motor weaknesses or sensory loss; moves all extremities.    LAB:  All pertinent labs reviewed and interpreted.  Labs Ordered and Resulted from Time of ED Arrival to Time of ED Departure   COMPREHENSIVE METABOLIC PANEL - Abnormal       Result Value    Sodium 142      Potassium 3.6      Chloride 104      Carbon Dioxide (CO2) 28      Anion Gap 10      Urea Nitrogen 19      Creatinine 0.83      Calcium 9.9      Glucose 92      Alkaline Phosphatase 139 (*)     AST 43 (*)     ALT 53 (*)     Protein Total 7.6      Albumin 4.5      Bilirubin Total 0.5      GFR Estimate 79     LIPASE - Normal    Lipase 46     TROPONIN I - Normal    Troponin I <0.01     D DIMER QUANTITATIVE - Normal    D-Dimer Quantitative 0.37     TSH WITH FREE T4 REFLEX - Normal    TSH 2.46     MAGNESIUM - Normal    Magnesium 1.9     CBC WITH PLATELETS AND DIFFERENTIAL    WBC Count 6.2   "    RBC Count 4.66      Hemoglobin 13.0      Hematocrit 39.7      MCV 85      MCH 27.9      MCHC 32.7      RDW 13.4      Platelet Count 250      % Neutrophils 61      % Lymphocytes 31      % Monocytes 6      % Eosinophils 2      % Basophils 0      % Immature Granulocytes 0      NRBCs per 100 WBC 0      Absolute Neutrophils 3.8      Absolute Lymphocytes 1.9      Absolute Monocytes 0.3      Absolute Eosinophils 0.1      Absolute Basophils 0.0      Absolute Immature Granulocytes 0.0      Absolute NRBCs 0.0         RADIOLOGY:  Reviewed all pertinent imaging. Please see official radiology report.  Chest XR,  PA & LAT   Final Result   IMPRESSION: Negative chest with no significant change.      Abdomen US, limited (RUQ only)   Final Result   IMPRESSION:   1.  Gallbladder surgically removed.      2.  Prominent bile ducts likely from previous cholecystectomy reservoir effect similar to CT 2017.      3.  Marked diffuse hepatic steatosis.                EKG:    Performed at: 12:23    I have independently reviewed and interpreted the EKG, along with the final read. EKG also reviewed by Dr. Olivera.    Ventricular rate 82 bpm  KS interval 160 ms  QRS duration 84 ms  QT/QTc 468/546 ms  P-R-T axes 77 44 76    Impression: Sinus rhythm; prolonged QT      Kathleen Lincoln PA-C  Emergency Medicine  [facility] [place] EMERGENCY ROOM  [facility] [facility] [id number] [phone]  Dept: [phone]       Kathleen Lincoln PA-C  01/16/23 9657

## 2023-01-16 NOTE — DISCHARGE INSTRUCTIONS
You were seen here today for evaluation of chest pain and shortness of breath.  Your work-up today is reassuring with no evidence of heart attack, blood clots, or significant liver/kidney dysfunction.  Your ultrasound was normal.  Your chest x-ray was also normal.    The cause of your symptoms is not entirely clear at this time.  Continue following with GI for management of your acid reflux and follow-up with your primary care provider regarding your ongoing shortness of breath.    Return to the emergency department if you develop any new or worsening symptoms including severe pain, fever, coughing up blood, worsening chest pain or difficulty breathing, persistent vomiting, or any other symptoms that concern you.

## 2023-01-16 NOTE — ED TRIAGE NOTES
Pt presents to the ED with c/o elevated BP - pt sent from [facility]. Pt states BP was 180-190s systolic. Pt also having intermittent CP and dyspnea since early December. Denies any fevers.

## 2023-01-17 ENCOUNTER — HOSPITAL ENCOUNTER (EMERGENCY)
Facility: CLINIC | Age: 63
Discharge: HOME | End: 2023-01-17
Attending: STUDENT IN AN ORGANIZED HEALTH CARE EDUCATION/TRAINING PROGRAM | Admitting: STUDENT IN AN ORGANIZED HEALTH CARE EDUCATION/TRAINING PROGRAM
Payer: COMMERCIAL

## 2023-01-17 ENCOUNTER — NURSE TRIAGE (OUTPATIENT)
Dept: NURSING | Facility: CLINIC | Age: 63
End: 2023-01-17
Payer: COMMERCIAL

## 2023-01-17 ENCOUNTER — APPOINTMENT (OUTPATIENT)
Dept: CT IMAGING | Facility: CLINIC | Age: 63
End: 2023-01-17
Attending: STUDENT IN AN ORGANIZED HEALTH CARE EDUCATION/TRAINING PROGRAM
Payer: COMMERCIAL

## 2023-01-17 VITALS
DIASTOLIC BLOOD PRESSURE: 68 MMHG | OXYGEN SATURATION: 94 % | BODY MASS INDEX: 32.44 KG/M2 | RESPIRATION RATE: 16 BRPM | TEMPERATURE: 97.2 F | SYSTOLIC BLOOD PRESSURE: 141 MMHG | WEIGHT: 190 LBS | HEIGHT: 64 IN | HEART RATE: 83 BPM

## 2023-01-17 DIAGNOSIS — R10.9 RIGHT FLANK PAIN: ICD-10-CM

## 2023-01-17 DIAGNOSIS — N30.00 ACUTE CYSTITIS WITHOUT HEMATURIA: ICD-10-CM

## 2023-01-17 DIAGNOSIS — R10.11 RUQ ABDOMINAL PAIN: ICD-10-CM

## 2023-01-17 LAB
ALBUMIN SERPL-MCNC: 4.3 G/DL (ref 3.5–5)
ALBUMIN UR-MCNC: 20 MG/DL
ALP SERPL-CCNC: 128 U/L (ref 45–120)
ALT SERPL W P-5'-P-CCNC: 54 U/L (ref 0–45)
ANION GAP SERPL CALCULATED.3IONS-SCNC: 11 MMOL/L (ref 5–18)
APPEARANCE UR: CLEAR
AST SERPL W P-5'-P-CCNC: 41 U/L (ref 0–40)
ATRIAL RATE - MUSE: 85 BPM
BASOPHILS # BLD AUTO: 0 10E3/UL (ref 0–0.2)
BASOPHILS NFR BLD AUTO: 0 %
BILIRUB SERPL-MCNC: 0.3 MG/DL (ref 0–1)
BILIRUB UR QL STRIP: NEGATIVE
BUN SERPL-MCNC: 25 MG/DL (ref 8–22)
CALCIUM SERPL-MCNC: 9.9 MG/DL (ref 8.5–10.5)
CHLORIDE BLD-SCNC: 107 MMOL/L (ref 98–107)
CO2 SERPL-SCNC: 26 MMOL/L (ref 22–31)
COLOR UR AUTO: ABNORMAL
CREAT SERPL-MCNC: 1.02 MG/DL (ref 0.6–1.1)
DIASTOLIC BLOOD PRESSURE - MUSE: NORMAL MMHG
EOSINOPHIL # BLD AUTO: 0.1 10E3/UL (ref 0–0.7)
EOSINOPHIL NFR BLD AUTO: 2 %
ERYTHROCYTE [DISTWIDTH] IN BLOOD BY AUTOMATED COUNT: 13.6 % (ref 10–15)
GFR SERPL CREATININE-BSD FRML MDRD: 62 ML/MIN/1.73M2
GLUCOSE BLD-MCNC: 141 MG/DL (ref 70–125)
GLUCOSE UR STRIP-MCNC: NEGATIVE MG/DL
HCT VFR BLD AUTO: 38.3 % (ref 35–47)
HGB BLD-MCNC: 12.7 G/DL (ref 11.7–15.7)
HGB UR QL STRIP: NEGATIVE
IMM GRANULOCYTES # BLD: 0 10E3/UL
IMM GRANULOCYTES NFR BLD: 0 %
INTERPRETATION ECG - MUSE: NORMAL
KETONES UR STRIP-MCNC: NEGATIVE MG/DL
LEUKOCYTE ESTERASE UR QL STRIP: ABNORMAL
LIPASE SERPL-CCNC: 67 U/L (ref 0–52)
LYMPHOCYTES # BLD AUTO: 2.2 10E3/UL (ref 0.8–5.3)
LYMPHOCYTES NFR BLD AUTO: 32 %
MCH RBC QN AUTO: 27.5 PG (ref 26.5–33)
MCHC RBC AUTO-ENTMCNC: 33.2 G/DL (ref 31.5–36.5)
MCV RBC AUTO: 83 FL (ref 78–100)
MONOCYTES # BLD AUTO: 0.4 10E3/UL (ref 0–1.3)
MONOCYTES NFR BLD AUTO: 6 %
MUCOUS THREADS #/AREA URNS LPF: PRESENT /LPF
NEUTROPHILS # BLD AUTO: 4 10E3/UL (ref 1.6–8.3)
NEUTROPHILS NFR BLD AUTO: 60 %
NITRATE UR QL: NEGATIVE
NRBC # BLD AUTO: 0 10E3/UL
NRBC BLD AUTO-RTO: 0 /100
P AXIS - MUSE: 77 DEGREES
PH UR STRIP: 6 [PH] (ref 5–7)
PLATELET # BLD AUTO: 269 10E3/UL (ref 150–450)
POTASSIUM BLD-SCNC: 3.4 MMOL/L (ref 3.5–5)
PR INTERVAL - MUSE: 174 MS
PROT SERPL-MCNC: 7.4 G/DL (ref 6–8)
QRS DURATION - MUSE: 84 MS
QT - MUSE: 384 MS
QTC - MUSE: 456 MS
R AXIS - MUSE: 49 DEGREES
RBC # BLD AUTO: 4.62 10E6/UL (ref 3.8–5.2)
RBC URINE: 3 /HPF
RENAL TUB EPI: <1 /HPF
SODIUM SERPL-SCNC: 144 MMOL/L (ref 136–145)
SP GR UR STRIP: 1.03 (ref 1–1.03)
SQUAMOUS EPITHELIAL: 1 /HPF
SYSTOLIC BLOOD PRESSURE - MUSE: NORMAL MMHG
T AXIS - MUSE: 85 DEGREES
TRANSITIONAL EPI: 1 /HPF
TROPONIN I SERPL-MCNC: 0.03 NG/ML (ref 0–0.29)
UROBILINOGEN UR STRIP-MCNC: <2 MG/DL
VENTRICULAR RATE- MUSE: 85 BPM
WBC # BLD AUTO: 6.8 10E3/UL (ref 4–11)
WBC URINE: 135 /HPF

## 2023-01-17 PROCEDURE — 93005 ELECTROCARDIOGRAM TRACING: CPT | Performed by: STUDENT IN AN ORGANIZED HEALTH CARE EDUCATION/TRAINING PROGRAM

## 2023-01-17 PROCEDURE — 250N000011 HC RX IP 250 OP 636: Performed by: EMERGENCY MEDICINE

## 2023-01-17 PROCEDURE — 83690 ASSAY OF LIPASE: CPT | Performed by: EMERGENCY MEDICINE

## 2023-01-17 PROCEDURE — 99285 EMERGENCY DEPT VISIT HI MDM: CPT | Mod: 25

## 2023-01-17 PROCEDURE — 96375 TX/PRO/DX INJ NEW DRUG ADDON: CPT

## 2023-01-17 PROCEDURE — 87086 URINE CULTURE/COLONY COUNT: CPT | Performed by: STUDENT IN AN ORGANIZED HEALTH CARE EDUCATION/TRAINING PROGRAM

## 2023-01-17 PROCEDURE — 96365 THER/PROPH/DIAG IV INF INIT: CPT | Mod: 59

## 2023-01-17 PROCEDURE — 250N000011 HC RX IP 250 OP 636

## 2023-01-17 PROCEDURE — 80053 COMPREHEN METABOLIC PANEL: CPT | Performed by: EMERGENCY MEDICINE

## 2023-01-17 PROCEDURE — 250N000011 HC RX IP 250 OP 636: Performed by: STUDENT IN AN ORGANIZED HEALTH CARE EDUCATION/TRAINING PROGRAM

## 2023-01-17 PROCEDURE — 36415 COLL VENOUS BLD VENIPUNCTURE: CPT | Performed by: EMERGENCY MEDICINE

## 2023-01-17 PROCEDURE — 85025 COMPLETE CBC W/AUTO DIFF WBC: CPT | Performed by: EMERGENCY MEDICINE

## 2023-01-17 PROCEDURE — 81001 URINALYSIS AUTO W/SCOPE: CPT | Performed by: STUDENT IN AN ORGANIZED HEALTH CARE EDUCATION/TRAINING PROGRAM

## 2023-01-17 PROCEDURE — 84484 ASSAY OF TROPONIN QUANT: CPT | Performed by: STUDENT IN AN ORGANIZED HEALTH CARE EDUCATION/TRAINING PROGRAM

## 2023-01-17 PROCEDURE — 74177 CT ABD & PELVIS W/CONTRAST: CPT

## 2023-01-17 RX ORDER — ONDANSETRON 2 MG/ML
4 INJECTION INTRAMUSCULAR; INTRAVENOUS EVERY 30 MIN PRN
Status: DISCONTINUED | OUTPATIENT
Start: 2023-01-17 | End: 2023-01-18 | Stop reason: HOSPADM

## 2023-01-17 RX ORDER — IOPAMIDOL 755 MG/ML
90 INJECTION, SOLUTION INTRAVASCULAR ONCE
Status: COMPLETED | OUTPATIENT
Start: 2023-01-17 | End: 2023-01-17

## 2023-01-17 RX ORDER — CEPHALEXIN 500 MG/1
500 CAPSULE ORAL 4 TIMES DAILY
Qty: 40 CAPSULE | Refills: 0 | Status: SHIPPED | OUTPATIENT
Start: 2023-01-18 | End: 2023-01-28

## 2023-01-17 RX ORDER — CEFTRIAXONE 1 G/1
1 INJECTION, POWDER, FOR SOLUTION INTRAMUSCULAR; INTRAVENOUS ONCE
Status: COMPLETED | OUTPATIENT
Start: 2023-01-17 | End: 2023-01-17

## 2023-01-17 RX ORDER — KETOROLAC TROMETHAMINE 15 MG/ML
15 INJECTION, SOLUTION INTRAMUSCULAR; INTRAVENOUS ONCE
Status: COMPLETED | OUTPATIENT
Start: 2023-01-17 | End: 2023-01-17

## 2023-01-17 RX ADMIN — KETOROLAC TROMETHAMINE 15 MG: 15 INJECTION, SOLUTION INTRAMUSCULAR; INTRAVENOUS at 21:31

## 2023-01-17 RX ADMIN — CEFTRIAXONE 1 G: 1 INJECTION, POWDER, FOR SOLUTION INTRAMUSCULAR; INTRAVENOUS at 22:24

## 2023-01-17 RX ADMIN — IOPAMIDOL 90 ML: 755 INJECTION, SOLUTION INTRAVENOUS at 22:35

## 2023-01-17 RX ADMIN — ONDANSETRON 4 MG: 2 INJECTION INTRAMUSCULAR; INTRAVENOUS at 21:33

## 2023-01-17 ASSESSMENT — ACTIVITIES OF DAILY LIVING (ADL)
ADLS_ACUITY_SCORE: 35
ADLS_ACUITY_SCORE: 33

## 2023-01-17 NOTE — TELEPHONE ENCOUNTER
"Triage Call:    Caller: Patient  Was seen in the ED yesterday and had an ultrasound of her liver.  She is having pain over the area where the ultrasound was done and is feeling bloated.  She noted they didn't push hard on the ultrasound at all.    She didn't have pain there before the ultrasound.  She has been using heat and isn't sure what to do.  She doesn't want to take medication without understanding more.  Rating pain 7/10 and if she moves a certain way \"it will hurt really bad and then when I move back it goes back\".  The pain also is in her back and \"down the side of her stomach.    Protocol Recommended Disposition: ED    Caller verbalized understanding of instructions and questions answered.      Naila Washington RN on 1/17/2023 at 5:08 PM        Reason for Disposition    [1] Pain lasts > 10 minutes AND [2] age > 50    Additional Information    Negative: SEVERE difficulty breathing (e.g., struggling for each breath, speaks in single words)    Negative: Shock suspected (e.g., cold/pale/clammy skin, too weak to stand, low BP, rapid pulse)    Negative: Passed out (i.e., lost consciousness, collapsed and was not responding)    Negative: Difficult to awaken or acting confused (e.g., disoriented, slurred speech)    Negative: Visible sweat on face or sweat dripping down face    Negative: Sounds like a life-threatening emergency to the triager    Negative: [1] SEVERE pain (e.g., excruciating) AND [2] present > 1 hour    Protocols used: ABDOMINAL PAIN - UPPER-A-AH      "

## 2023-01-18 NOTE — ED PROVIDER NOTES
EMERGENCY DEPARTMENT SIGN OUT NOTE        ED COURSE AND MEDICAL DECISION MAKING  Patient was signed out to me by Dr Nena Boroks at 2230    In brief, Ella Chiang is a 62 year old female who initially presented with right sided abdominal pain likely secondary to a urinary tract infection.    At time of sign out, CT results and disposition were pending.  2315-advised patient of CT findings and plan for discharge with antibiotic therapy for treatment of urinary tract infection.  The patient was comfortable with this plan.    FINAL IMPRESSION    1. RUQ abdominal pain    2. Right flank pain    3. Acute cystitis without hematuria        ED MEDS  Medications   ondansetron (ZOFRAN) injection 4 mg (4 mg Intravenous Given 1/17/23 2133)   ketorolac (TORADOL) injection 15 mg (15 mg Intravenous Given 1/17/23 2131)   cefTRIAXone (ROCEPHIN) 1 g vial to attach to  mL bag for ADULTS or NS 50 mL bag for PEDS (0 g Intravenous Stopped 1/17/23 2304)   iopamidol (ISOVUE-370) solution 90 mL (90 mLs Intravenous Given 1/17/23 2235)       LAB  Labs Ordered and Resulted from Time of ED Arrival to Time of ED Departure   COMPREHENSIVE METABOLIC PANEL - Abnormal       Result Value    Sodium 144      Potassium 3.4 (*)     Chloride 107      Carbon Dioxide (CO2) 26      Anion Gap 11      Urea Nitrogen 25 (*)     Creatinine 1.02      Calcium 9.9      Glucose 141 (*)     Alkaline Phosphatase 128 (*)     AST 41 (*)     ALT 54 (*)     Protein Total 7.4      Albumin 4.3      Bilirubin Total 0.3      GFR Estimate 62     LIPASE - Abnormal    Lipase 67 (*)    ROUTINE UA WITH MICROSCOPIC REFLEX TO CULTURE - Abnormal    Color Urine Light Yellow      Appearance Urine Clear      Glucose Urine Negative      Bilirubin Urine Negative      Ketones Urine Negative      Specific Gravity Urine 1.029      Blood Urine Negative      pH Urine 6.0      Protein Albumin Urine 20 (*)     Urobilinogen Urine <2.0      Nitrite Urine Negative      Leukocyte Esterase Urine  500 Kimo/uL (*)     Mucus Urine Present (*)     RBC Urine 3 (*)     WBC Urine 135 (*)     Squamous Epithelials Urine 1      Transitional Epithelials Urine 1      Renal Tubular Epithelials Urine <1     TROPONIN I - Normal    Troponin I 0.03     CBC WITH PLATELETS AND DIFFERENTIAL    WBC Count 6.8      RBC Count 4.62      Hemoglobin 12.7      Hematocrit 38.3      MCV 83      MCH 27.5      MCHC 33.2      RDW 13.6      Platelet Count 269      % Neutrophils 60      % Lymphocytes 32      % Monocytes 6      % Eosinophils 2      % Basophils 0      % Immature Granulocytes 0      NRBCs per 100 WBC 0      Absolute Neutrophils 4.0      Absolute Lymphocytes 2.2      Absolute Monocytes 0.4      Absolute Eosinophils 0.1      Absolute Basophils 0.0      Absolute Immature Granulocytes 0.0      Absolute NRBCs 0.0     URINE CULTURE       RADIOLOGY    CT Abdomen Pelvis w Contrast   Final Result   IMPRESSION:    1.  No acute process.   2.  Hepatomegaly with steatosis.   3.  Sigmoid diverticulosis.          DISCHARGE MEDS  New Prescriptions    CEPHALEXIN (KEFLEX) 500 MG CAPSULE    Take 1 capsule (500 mg) by mouth 4 times daily for 10 days       Zuly Vieira MD  [facility] EMERGENCY ROOM  [address] [facility] [id number] [phone]       Zuly Vieira MD  01/17/23 8967

## 2023-01-18 NOTE — ED TRIAGE NOTES
Pt with RUQ pain that started this AM. She has hx of a fatty liver and was seen in the ED yesterday for HTN. She had an US done and is unsure if the pain is from that. She has not taken any medications PTA.

## 2023-01-18 NOTE — ED NOTES
Patient educated about need for a urine sample. Call light within reach, able to make needs known and call appropriately.

## 2023-01-18 NOTE — ED PROVIDER NOTES
"Emergency Department Midlevel Supervisory Note     I personally saw the patient and performed a substantive portion of the visit including all aspects of the medical decision making.    ED Course:  9:09 PM Dr. Coffman, resident, staffed patient with me. I agree with their assessment and plan of management, and I will see the patient.  9:13 PM I met with the patient to introduce myself, gather additional history, perform my initial exam, and discuss the plan.     Brief HPI:     Ella Chiang is a 62 year old female with a pertinent history of GERD, hepatic steatosis, cholecystectomy who presents for evaluation of abdominal pain. Today, patient reports that she had some tenderness to palpation of her RUQ during ultrasound yesterday, then woke up this morning with right upper quadrant pain that is more lateral and radiates to her back.  Continues to have reflux symptoms and dyspnea on exertion that is unchanged from ED visit yesterday.  Also notes some epigastric tenderness that has been there for a few weeks.  Right upper quadrant pain described as achy like a bruise.  Also has nausea, but no vomiting and able to tolerate p.o. intake.  Denies fever, cough (has chronic intermittent cough due to reflux), chest pressure, dysuria, changes in bowel movements (alternates between mucous stools and loose stools), and new leg swelling.  Had cholecystectomy decades ago; also has had an appendectomy and diverticulosis.  No alcohol use.  No prior history of liver disease or colitis.    I, Bhargavi Carlos, am serving as a scribe to document services personally performed by Dr. Brooks, based on my observations and the provider's statements to me.   I, Dr. Brooks attest that Bhargavi Carlos was acting in a scribe capacity, has observed my performance of the services and has documented them in accordance with my direction.    Brief Physical Exam: /60   Pulse 84   Temp 97.2  F (36.2  C) (Temporal)   Resp 22   Ht 1.626 m (5' 4\")   Wt " 86.2 kg (190 lb)   SpO2 93%   BMI 32.61 kg/m    Constitutional:  Alert, in no acute distress  EYES: Conjunctivae clear  HENT:  Atraumatic, normocephalic  Respiratory:  Respirations even, unlabored, in no acute respiratory distress  Cardiovascular:  Regular rate and rhythm  GI: Soft, not distended, has some mild right flank and RUQ/oblique pain, no guarding  Musculoskeletal:  No edema. No deformities noted.  Integument: Warm, Dry, No erythema, No rash.   Neurologic:  Alert & oriented, no focal deficits noted  Back: no midline spinal tenderness  Psych: Normal mood and affect      MDM:  61 y/o F who presents with abdominal/flank pain. Seen yesterday in ED for hypertension and 1 month of chest/back pain. Labs, EKG, troponin, CXR and RUQ US were all generally reassuring. Now with worsening pain to her right RUQ/right flank. Mild elevation in lipase here but not >3x upper limit of normal, LFTs similar to yesterday. EKG is sinus without concerning ischemic changes from yesterday, troponin WNL, low suspicion for atypical ACS and she plans to follow up with primary regarding further cardiac workup. Given nature/duration of her pain, normal d-dimer and no widened mediastinum on CXR (both done yesterday), do not suspect PE or dissection.     Dx would also include gastritis, ulcer, pancreatitis, kidney stone, UTI/pyelo, muscle strain, among others.She was given toradol and zofran with some improvement in pain here. BP has improved. Her UA does show signs of infection. Will give ceftriaxone and get CT scan to further assess. Signed out to oncoming provider pending CT scan and final disposition.     1. RUQ abdominal pain    2. Right flank pain      Medical Decision Making    History:    Supplemental history from: Documented in chart, if applicable    External Record(s) reviewed: Documented in chart, if applicable.    Work Up:    Chart documentation includes differential considered and any EKGs or imaging independently  interpreted by provider, where specified.    In additional to work up documented, I considered the following work up: Documented in chart, if applicable.    External consultation:    Discussion of management with another provider: Documented in chart, if applicable    Complicating factors:    Care impacted by chronic illness: Hypertension    Care affected by social determinants of health: N/A    Disposition considerations: Admission considered. Patient was signed out to the oncoming physician, disposition pending.  Labs and Imaging:  Results for orders placed or performed during the hospital encounter of 01/17/23   CT Abdomen Pelvis w Contrast    Impression    IMPRESSION:   1.  No acute process.  2.  Hepatomegaly with steatosis.  3.  Sigmoid diverticulosis.   Comprehensive metabolic panel   Result Value Ref Range    Sodium 144 136 - 145 mmol/L    Potassium 3.4 (L) 3.5 - 5.0 mmol/L    Chloride 107 98 - 107 mmol/L    Carbon Dioxide (CO2) 26 22 - 31 mmol/L    Anion Gap 11 5 - 18 mmol/L    Urea Nitrogen 25 (H) 8 - 22 mg/dL    Creatinine 1.02 0.60 - 1.10 mg/dL    Calcium 9.9 8.5 - 10.5 mg/dL    Glucose 141 (H) 70 - 125 mg/dL    Alkaline Phosphatase 128 (H) 45 - 120 U/L    AST 41 (H) 0 - 40 U/L    ALT 54 (H) 0 - 45 U/L    Protein Total 7.4 6.0 - 8.0 g/dL    Albumin 4.3 3.5 - 5.0 g/dL    Bilirubin Total 0.3 0.0 - 1.0 mg/dL    GFR Estimate 62 >60 mL/min/1.73m2   Result Value Ref Range    Lipase 67 (H) 0 - 52 U/L   CBC with platelets and differential   Result Value Ref Range    WBC Count 6.8 4.0 - 11.0 10e3/uL    RBC Count 4.62 3.80 - 5.20 10e6/uL    Hemoglobin 12.7 11.7 - 15.7 g/dL    Hematocrit 38.3 35.0 - 47.0 %    MCV 83 78 - 100 fL    MCH 27.5 26.5 - 33.0 pg    MCHC 33.2 31.5 - 36.5 g/dL    RDW 13.6 10.0 - 15.0 %    Platelet Count 269 150 - 450 10e3/uL    % Neutrophils 60 %    % Lymphocytes 32 %    % Monocytes 6 %    % Eosinophils 2 %    % Basophils 0 %    % Immature Granulocytes 0 %    NRBCs per 100 WBC 0 <1 /100     Absolute Neutrophils 4.0 1.6 - 8.3 10e3/uL    Absolute Lymphocytes 2.2 0.8 - 5.3 10e3/uL    Absolute Monocytes 0.4 0.0 - 1.3 10e3/uL    Absolute Eosinophils 0.1 0.0 - 0.7 10e3/uL    Absolute Basophils 0.0 0.0 - 0.2 10e3/uL    Absolute Immature Granulocytes 0.0 <=0.4 10e3/uL    Absolute NRBCs 0.0 10e3/uL   Troponin I (now)   Result Value Ref Range    Troponin I 0.03 0.00 - 0.29 ng/mL   UA with Microscopic reflex to Culture    Specimen: Urine, Clean Catch   Result Value Ref Range    Color Urine Light Yellow Colorless, Straw, Light Yellow, Yellow    Appearance Urine Clear Clear    Glucose Urine Negative Negative mg/dL    Bilirubin Urine Negative Negative    Ketones Urine Negative Negative mg/dL    Specific Gravity Urine 1.029 1.001 - 1.030    Blood Urine Negative Negative    pH Urine 6.0 5.0 - 7.0    Protein Albumin Urine 20 (A) Negative mg/dL    Urobilinogen Urine <2.0 <2.0 mg/dL    Nitrite Urine Negative Negative    Leukocyte Esterase Urine 500 Kimo/uL (A) Negative    Mucus Urine Present (A) None Seen /LPF    RBC Urine 3 (H) <=2 /HPF    WBC Urine 135 (H) <=5 /HPF    Squamous Epithelials Urine 1 <=1 /HPF    Transitional Epithelials Urine 1 <=1 /HPF    Renal Tubular Epithelials Urine <1 None Seen /HPF   ECG 12-LEAD WITH MUSE (LHE)   Result Value Ref Range    Systolic Blood Pressure  mmHg    Diastolic Blood Pressure  mmHg    Ventricular Rate 85 BPM    Atrial Rate 85 BPM    TN Interval 174 ms    QRS Duration 84 ms     ms    QTc 456 ms    P Axis 77 degrees    R AXIS 49 degrees    T Axis 85 degrees    Interpretation ECG       Sinus rhythm  Nonspecific T wave abnormality  Abnormal ECG  When compared with ECG of 16-JAN-2023 12:23,  Nonspecific T wave abnormality has replaced inverted T waves in Lateral leads  QT has shortened  Confirmed by SEE ED PROVIDER NOTE FOR, ECG INTERPRETATION ([id number]),  Melanie Orellana ([id number]) on 1/17/2023 9:20:57 PM       I have reviewed the relevant laboratory and radiology  studies      EKG:  Performed at: [facility] Emergency Department. 17-Jan-2023, 21:17:31.    Impression: Sinus rhythm. Nonspecific T wave abnormality.  Rate: 85 BPM  Rhythm: Sinus rhythm  Axis: 77 - 49 - 85  FL Interval: 174 ms  QRS Interval: 84 ms  QTc Interval: 456 ms  ST Changes: Nonspecific T wave abnormality has replaced inverted T waves in lateral leads  Comparison: Compared with ECG of 16-Jan-2023, 12:23. Nonspecific T wave abnormality has replaced inverted T waves in lateral leads. QT has shortened.    I have independently reviewed and interpreted the EKG(s) documented above.      Procedures:  I was present for the key portions of this procedure: none      Nena Brooks MD  [facility] EMERGENCY ROOM  [address] [facility] [id number] [phone]     Nena Brooks MD  01/17/23 3076

## 2023-01-18 NOTE — ED PROVIDER NOTES
EMERGENCY DEPARTMENT ENCOUNTER      NAME: Ella Chiang  AGE: 62 year old female  YOB: 1960  MRN: [id number]  EVALUATION DATE & TIME: No admission date for patient encounter.    PCP: Christina Tanner    ED PROVIDER: Jackelin Coffman MD      Chief Complaint   Patient presents with     Abdominal Pain         FINAL IMPRESSION:  No diagnosis found.      ED COURSE & MEDICAL DECISION MAKING:    Pertinent Labs & Imaging studies reviewed. (See chart for details)  62 year old female presents to the Emergency Department for evaluation of new acute RUQ and back pain x 1 day.     Patient was seen in ED yesterday for chest pressure and dyspnea x 2 months and elevated blood pressures at clinic.  Chest x-ray, troponin, D-dimer, and lipase negative.  LFTs were slightly elevated, but abdominal ultrasound unremarkable except for marked diffuse hepatic steatosis.    On exam, patient had tenderness to palpation in the epigastrium, right upper quadrant, and right lower back paraspinal muscles.  No flank tenderness bilaterally.  Labs showed normal WBC, LFTs at similar levels to yesterday, lipase slightly elevated, EKG largely unchanged from yesterday.    Differential diagnosis includes hepatitis, pancreatitis, renal stone, UTI, muscle spasm.    ED Course as of 01/17/23 2217 Tue Jan 17, 2023 2030 I staffed the pt with Dr. Brooks.   2039 I saw and examined the patient while wearing appropriate PPE.   2118 WBC normal, LFTs similar to yesterday's levels, lipase slightly higher than yesterday at 67, CBC and CMP otherwise unremarkable.   2125 ECG 12-LEAD WITH MUSE (LHE)  EKG largely unchanged from yesterday.   2211 Troponin I: 0.03   2211 UA with Microscopic reflex to Culture(!)  UA showing +leuk est and WBCs, negative nitrite. UC pending. Will obtain CT A/P for further evaluation and give one dose of ceftriaxone for UTI.   2216 Pt updated on plan and signed out to Dr. Vieira.       At the conclusion of the encounter I  discussed the results of all of the tests and the disposition. The questions were answered. The patient or family acknowledged understanding and was agreeable with the care plan.     MEDICATIONS GIVEN IN THE EMERGENCY:  Medications   ondansetron (ZOFRAN) injection 4 mg (4 mg Intravenous Given 1/17/23 2133)   cefTRIAXone (ROCEPHIN) 1 g vial to attach to  mL bag for ADULTS or NS 50 mL bag for PEDS (has no administration in time range)   ketorolac (TORADOL) injection 15 mg (15 mg Intravenous Given 1/17/23 2131)       NEW PRESCRIPTIONS STARTED AT TODAY'S ER VISIT  New Prescriptions    No medications on file          =================================================================    HPI    Patient information was obtained from: patient        Ella Chiang is a 62 year old female with a pertinent history of GERD, hepatic steatosis, cholecystectomy who presents to this ED for evaluation of right upper quadrant pain that began this morning.  Patient was seen in ED yesterday for chest pressure and dyspnea x 2 months and elevated blood pressures at clinic.  Chest x-ray, troponin, D-dimer, and lipase negative.  LFTs were slightly elevated, but abdominal ultrasound unremarkable except for marked diffuse hepatic steatosis.  Today, patient reports that she had some tenderness to palpation of her RUQ during ultrasound yesterday, then woke up this morning with right upper quadrant pain that is more lateral and radiates to her back.  Continues to have reflux symptoms and dyspnea on exertion that is unchanged from ED visit yesterday.  Also notes some epigastric tenderness that has been there for a few weeks.  Right upper quadrant pain described as achy like a bruise.  Also has nausea, but no vomiting and able to tolerate p.o. intake.  Denies fever, cough (has chronic intermittent cough due to reflux), chest pressure, dysuria, changes in bowel movements (alternates between mucous stools and loose stools), and new leg  "swelling.  Had cholecystectomy decades ago; also has had an appendectomy and diverticulosis.  No alcohol use.  No prior history of liver disease or colitis.      REVIEW OF SYSTEMS   Review of Systems as above in HPI    PAST MEDICAL HISTORY:  Past Medical History:   Diagnosis Date     Hypertension        PAST SURGICAL HISTORY:  Past Surgical History:   Procedure Laterality Date     IR LUMBAR EPIDURAL STEROID INJECTION  2010     IA VAGINAL HYSTERECTOMY,UTERUS 250 GMS/<      Description: Vaginal Hysterectomy;  Recorded: 2010;           CURRENT MEDICATIONS:    atorvastatin (LIPITOR) 80 MG tablet  escitalopram (LEXAPRO) 10 MG tablet  LORazepam (ATIVAN) 1 MG tablet  losartan (COZAAR) 50 MG tablet  omeprazole (PRILOSEC) 20 MG DR capsule        ALLERGIES:  Allergies   Allergen Reactions     Gabapentin Unknown     Nortriptyline Unknown     Simvastatin Unknown     Sulfa (Sulfonamide Antibiotics) [Sulfa Drugs] Unknown       FAMILY HISTORY:  Family History   Problem Relation Age of Onset     Hypertension Mother      Cancer Mother      Cancer Father      Cancer Brother      Cancer Sister        SOCIAL HISTORY:   Social History     Socioeconomic History     Marital status:      Spouse name: None     Number of children: None     Years of education: None     Highest education level: None   Tobacco Use     Smoking status: Former     Packs/day: 0.50     Types: Cigarettes     Quit date: 2010     Years since quittin.7     Smokeless tobacco: Never   Substance and Sexual Activity     Alcohol use: Never     Drug use: Never       VITALS:  BP (!) 196/98   Pulse 102   Temp 97.2  F (36.2  C) (Temporal)   Resp 18   Ht 1.626 m (5' 4\")   Wt 86.2 kg (190 lb)   SpO2 93%   BMI 32.61 kg/m      PHYSICAL EXAM    Constitutional: Well developed, Well nourished, NAD  HENT: Normocephalic, Atraumatic, Bilateral external ears normal  Eyes: Conjunctiva normal, No discharge.   Respiratory: Normal breath sounds, No " respiratory distress, No wheezing, Speaks full sentences easily. No cough.   Cardiovascular: Normal heart rate, Regular rhythm, No murmurs, No rubs, No gallops. Chest wall nontender.   GI: Bowel sounds normal, Soft, No masses, No flank tenderness. No rebound or guarding. Tenderness to palpation in epigastric and RUQ. Negative Vail's sign.  Musculoskeletal: No edema. Good range of motion in all major joints. No major deformities noted. Tender to palpation in R lower back paraspinal muscles.   Integument: Warm, Dry, No erythema, No rash. No petechiae. No bruising seen on abdomen.  Neurologic: Alert & oriented x 3, Normal motor function, Normal sensory function, No focal deficits noted. Normal gait.  Psychiatric: Affect normal, Judgment normal, Mood normal. Cooperative.     LAB:  All pertinent labs reviewed and interpreted.  Results for orders placed or performed during the hospital encounter of 01/17/23   Comprehensive metabolic panel   Result Value Ref Range    Sodium 144 136 - 145 mmol/L    Potassium 3.4 (L) 3.5 - 5.0 mmol/L    Chloride 107 98 - 107 mmol/L    Carbon Dioxide (CO2) 26 22 - 31 mmol/L    Anion Gap 11 5 - 18 mmol/L    Urea Nitrogen 25 (H) 8 - 22 mg/dL    Creatinine 1.02 0.60 - 1.10 mg/dL    Calcium 9.9 8.5 - 10.5 mg/dL    Glucose 141 (H) 70 - 125 mg/dL    Alkaline Phosphatase 128 (H) 45 - 120 U/L    AST 41 (H) 0 - 40 U/L    ALT 54 (H) 0 - 45 U/L    Protein Total 7.4 6.0 - 8.0 g/dL    Albumin 4.3 3.5 - 5.0 g/dL    Bilirubin Total 0.3 0.0 - 1.0 mg/dL    GFR Estimate 62 >60 mL/min/1.73m2   Result Value Ref Range    Lipase 67 (H) 0 - 52 U/L   CBC with platelets and differential   Result Value Ref Range    WBC Count 6.8 4.0 - 11.0 10e3/uL    RBC Count 4.62 3.80 - 5.20 10e6/uL    Hemoglobin 12.7 11.7 - 15.7 g/dL    Hematocrit 38.3 35.0 - 47.0 %    MCV 83 78 - 100 fL    MCH 27.5 26.5 - 33.0 pg    MCHC 33.2 31.5 - 36.5 g/dL    RDW 13.6 10.0 - 15.0 %    Platelet Count 269 150 - 450 10e3/uL    % Neutrophils 60 %     % Lymphocytes 32 %    % Monocytes 6 %    % Eosinophils 2 %    % Basophils 0 %    % Immature Granulocytes 0 %    NRBCs per 100 WBC 0 <1 /100    Absolute Neutrophils 4.0 1.6 - 8.3 10e3/uL    Absolute Lymphocytes 2.2 0.8 - 5.3 10e3/uL    Absolute Monocytes 0.4 0.0 - 1.3 10e3/uL    Absolute Eosinophils 0.1 0.0 - 0.7 10e3/uL    Absolute Basophils 0.0 0.0 - 0.2 10e3/uL    Absolute Immature Granulocytes 0.0 <=0.4 10e3/uL    Absolute NRBCs 0.0 10e3/uL   Troponin I (now)   Result Value Ref Range    Troponin I 0.03 0.00 - 0.29 ng/mL   UA with Microscopic reflex to Culture    Specimen: Urine, Clean Catch   Result Value Ref Range    Color Urine Light Yellow Colorless, Straw, Light Yellow, Yellow    Appearance Urine Clear Clear    Glucose Urine Negative Negative mg/dL    Bilirubin Urine Negative Negative    Ketones Urine Negative Negative mg/dL    Specific Gravity Urine 1.029 1.001 - 1.030    Blood Urine Negative Negative    pH Urine 6.0 5.0 - 7.0    Protein Albumin Urine 20 (A) Negative mg/dL    Urobilinogen Urine <2.0 <2.0 mg/dL    Nitrite Urine Negative Negative    Leukocyte Esterase Urine 500 Kimo/uL (A) Negative    Mucus Urine Present (A) None Seen /LPF    RBC Urine 3 (H) <=2 /HPF    WBC Urine 135 (H) <=5 /HPF    Squamous Epithelials Urine 1 <=1 /HPF    Transitional Epithelials Urine 1 <=1 /HPF    Renal Tubular Epithelials Urine <1 None Seen /HPF   ECG 12-LEAD WITH MUSE (LHE)   Result Value Ref Range    Systolic Blood Pressure  mmHg    Diastolic Blood Pressure  mmHg    Ventricular Rate 85 BPM    Atrial Rate 85 BPM    MO Interval 174 ms    QRS Duration 84 ms     ms    QTc 456 ms    P Axis 77 degrees    R AXIS 49 degrees    T Axis 85 degrees    Interpretation ECG       Sinus rhythm  Nonspecific T wave abnormality  Abnormal ECG  When compared with ECG of 16-JAN-2023 12:23,  Nonspecific T wave abnormality has replaced inverted T waves in Lateral leads  QT has shortened  Confirmed by SEE ED PROVIDER NOTE FOR, ECG  INTERPRETATION ([id number]),  Melanie Orellana ([id number]) on 1/17/2023 9:20:57 PM         RADIOLOGY:  Reviewed all pertinent imaging. Please see official radiology report.  CT Abdomen Pelvis w Contrast    (Results Pending)       EKG:    Performed at: [facility]    Impression: sinus rhythm, appears largely unchanged from yesterday    I have independently reviewed and interpreted the EKG(s) documented above.      Pt staffed with Dr. Brooks.      Jackelin Coffman MD PGY-1  [facility] EMERGENCY ROOM  [address] [address] [phone]     Jackelin Coffman MD  Resident  01/17/23 5808

## 2023-01-18 NOTE — DISCHARGE INSTRUCTIONS
We are going to treat you for a possible kidney infection  Continue to follow closely with your primary care doctor and GI team    Return to the Emergency Room with new/worsening chest or abdominal pain, fevers, unable to keep down foods/fluids or other worsening symptoms or concerns

## 2023-01-19 LAB — BACTERIA UR CULT: NORMAL

## 2023-02-07 ENCOUNTER — HOSPITAL ENCOUNTER (OUTPATIENT)
Dept: ULTRASOUND IMAGING | Facility: CLINIC | Age: 63
Discharge: HOME | End: 2023-02-07
Attending: PHYSICIAN ASSISTANT | Admitting: PHYSICIAN ASSISTANT
Payer: COMMERCIAL

## 2023-02-07 DIAGNOSIS — R79.89 ELEVATED LFTS: ICD-10-CM

## 2023-02-07 PROCEDURE — 76705 ECHO EXAM OF ABDOMEN: CPT

## 2023-04-17 ENCOUNTER — TRANSCRIBE ORDERS (OUTPATIENT)
Dept: OTHER | Age: 63
End: 2023-04-17

## 2023-04-17 DIAGNOSIS — R79.89 ELEVATED LFTS: Primary | ICD-10-CM

## 2023-04-17 DIAGNOSIS — R10.11 RUQ PAIN: ICD-10-CM

## 2023-04-17 DIAGNOSIS — R13.10 DYSPHAGIA, UNSPECIFIED TYPE: ICD-10-CM

## 2023-05-02 ENCOUNTER — HOSPITAL ENCOUNTER (OUTPATIENT)
Dept: RADIOLOGY | Facility: CLINIC | Age: 63
Discharge: HOME | End: 2023-05-02
Attending: PHYSICIAN ASSISTANT
Payer: COMMERCIAL

## 2023-05-02 ENCOUNTER — HOSPITAL ENCOUNTER (OUTPATIENT)
Dept: SPEECH THERAPY | Facility: CLINIC | Age: 63
Discharge: HOME | End: 2023-05-02
Attending: PHYSICIAN ASSISTANT
Payer: COMMERCIAL

## 2023-05-02 DIAGNOSIS — R10.11 RUQ PAIN: ICD-10-CM

## 2023-05-02 DIAGNOSIS — R79.89 ELEVATED LFTS: ICD-10-CM

## 2023-05-02 DIAGNOSIS — R13.10 DYSPHAGIA, UNSPECIFIED: ICD-10-CM

## 2023-05-02 PROCEDURE — 92611 MOTION FLUOROSCOPY/SWALLOW: CPT | Mod: GN

## 2023-05-02 PROCEDURE — 74230 X-RAY XM SWLNG FUNCJ C+: CPT

## 2023-05-02 PROCEDURE — 92526 ORAL FUNCTION THERAPY: CPT | Mod: GN

## 2023-05-02 PROCEDURE — 74221 X-RAY XM ESOPHAGUS 2CNTRST: CPT

## 2023-05-02 PROCEDURE — 250N000013 HC RX MED GY IP 250 OP 250 PS 637: Performed by: PHYSICIAN ASSISTANT

## 2023-05-02 RX ADMIN — ANTACID/ANTIFLATULENT 4 G: 380; 550; 10; 10 GRANULE, EFFERVESCENT ORAL at 09:44

## 2023-05-08 NOTE — PROGRESS NOTES
OUTPATIENT SWALLOW  EVALUATION  PLAN OF TREATMENT FOR OUTPATIENT REHABILITATION  (COMPLETE FOR INITIAL CLAIMS ONLY)  Patient's Last Name, First Name, M.I.  YOB: 1960  Ella Chiang     Provider's Name   MARGUERITE Guthrie   Medical Record No.  [id number]     Start of Care Date:  05/02/23   Onset Date:  04/17/23 (Order date)   Type:     ___PT   ____OT  ___X_SLP Medical Diagnosis:  (P) Dysphagia, unspecified type     Treatment Diagnosis:  Esophageal dysphagia Visits from SOC:  1     _________________________________________________________________________________  Plan of Treatment/Functional Goals:  Planned Therapy Interventions: Dysphagia Treatment  Dysphagia treatment: Modified diet education, Instruction of safe swallow strategies, Compensatory strategies for swallowing      Goals   1. Goal Identifier: (P) GERD precautions       Goal Description: (P) Patient will verbalize understanding of dietary and lifestyle changes for management of GERD symptoms.       Target Date: (P) 05/02/23       Date Met: (P) 05/02/23        Predicted Duration of Therapy Intervention (days/wks): 1 session (Today)    MARGUERITE Guthrie       I CERTIFY THE NEED FOR THESE SERVICES FURNISHED UNDER        THIS PLAN OF TREATMENT AND WHILE UNDER MY CARE     (Physician co-signature of this document indicates review and certification of the therapy plan).                Certification date from: (P) 05/02/23 Certification date to: (P) 05/02/23          Referring Physician: Nani Youssef PA-C ([facility])    Initial Assessment        See Epic Evaluation Start Of Care Date: 05/02/23

## 2023-05-08 NOTE — PROGRESS NOTES
"Videofluoroscopic Swallow Study with Speech Pathology     05/02/23 0900   General Information   Type Of Visit Initial   Start Of Care Date 05/02/23   Referring Physician Nani Youssef PA-C  ([facility])   Orders Evaluate And Treat   Medical Diagnosis Elevated LFTs; RUQ pain; Dysphagia, unspecified type   Onset Of Illness/injury Or Date Of Surgery 04/17/23  (Order date)   Pertinent History of Current Problem/OT: Additional Occupational Profile Info   Patient is a 62 year old female referred for video swallow study and esophagram due to concerns of dysphagia. When asked to describe her swallowing issue(s), patient states, \"It feels like something is stuck in my throat.\"  She also describes a feeling of \"fullness\" in her esophagus, as well as vocal hoarseness and a frequent sore throat.  Notably, patient stated, \"I get a lot of acid reflux.\" She also reports a feeling of fullness/tightness in her throat when she wakes up in the morning. Patient takes omeprazole 2x/day, as well as Tums as needed. She participated in an EGD in February 2023. To the best of her recollection, this was unremarkable.     Respiratory Status Room air   General Observations Patient was pleasant and cooperative. She arrived to this appointment unaccompanied.   Abuse Screen (yes response referral indicated)   Feels Unsafe at Home or Work/School no   Feels Threatened by Someone no   Does Anyone Try to Keep You From Having Contact with Others or Doing Things Outside Your Home? no   Physical Signs of Abuse Present no   VFSS Evaluation   VFSS Additional Documentation Yes   VFSS Eval: Radiology   Radiologist Mary Castillo MD   Views Taken left lateral   Physical Location of Procedure [facility]   VFSS Eval: Thin Liquid Texture Trial   Mode of Presentation, Thin Liquid cup;straw;self-fed   Order of Presentation Trials 1, 2, 3   Preparatory Phase WFL  (Mildly reduced bolus control)   Oral Phase, Thin Liquid " Premature pharyngeal entry   Pharyngeal Phase, Thin Liquid Delayed swallow reflex   Rosenbek's Penetration Aspiration Scale: Thin Liquid Trial Results 1 - no aspiration, contrast does not enter airway   Diagnostic Statement Bolus control was mildly reduced, resulting in premature spillage of a portion of the bolus past the tongue base and to the valleculae. Swallow response was delayed, resulting in pourover to the pyriform sinuses. There was minimal laryngeal penetration or epiglottic undercoating. This was shallow and transient in nature. No aspiration observed.   VFSS Eval: Puree Solid Texture Trial   Mode of Presentation, Puree spoon;self-fed   Order of Presentation Trials 4, 5, 6   Preparatory Phase WFL   Oral Phase, Puree other (see comments)  (Delayed A-P bolus transit)   Pharyngeal Phase, Puree WFL   Rosenbek's Penetration Aspiration Scale: Puree Food Trial Results 1 - no aspiration, contrast does not enter airway   Diagnostic Statement Anterior-posterior bolus transit was mildly-moderately delayed. This appeared related, at least in part, to the sticky nature of the pudding-thick barium.   Esophageal Phase of Swallow   Patient reports or presents with symptoms of esophageal dysphagia Yes   Esophageal comments Please refer to Radiologist's dedicated esophagram report for details.   General Therapy Interventions   Planned Therapy Interventions Dysphagia Treatment   Dysphagia treatment Modified diet education;Instruction of safe swallow strategies;Compensatory strategies for swallowing   Swallow Eval: Clinical Impressions   Skilled Criteria for Therapy Intervention Skilled criteria met.  Treatment indicated.   Dysphagia Outcome Severity Scale (MATTHEW) Level 7 - MATTHEW   Treatment Diagnosis Esophageal dysphagia   Diet texture recommendations Regular diet;Thin liquids (level 0)   Recommended Feeding/Eating Techniques maintain upright posture during/after eating for 30 mins;small sips/bites;alternate between small  bites and sips of food/liquid;other (see comments)  (Slow rate of intake)   Rehab Potential good, to achieve stated therapy goals   Predicted Duration of Therapy Intervention (days/wks) 1 session  (Today)   Risks and Benefits of Treatment have been explained. Yes   Patient, family and/or staff in agreement with Plan of Care Yes   Clinical Impression Comments   Video swallow study completed per provider order. Patient's oropharyngeal swallow function is WNL for her age group. No aspiration or significant laryngeal penetration observed. No oropharyngeal stasis noted after swallows.     Oral phase is notable for mildly reduced bolus control with thin liquid, resulting in premature spillage of a portion of the bolus past the base of the tongue and to the valleculae. With both consistencies, A-P bolus transit was effective and timely. Tongue base retraction was WNL. Pharyngeal phase is characterized by a delayed swallow response with thin liquid. This result in pourover to the pyriform sinuses. Epiglottic inversion is complete. Hyolaryngeal excursion was reduced. Hyolaryngeal elevation was also reduced. Pharyngeal constriction was WNL. With both consistencies, contrast material readily passed through the PES and cervical esophagus.     Patient's aspiration risk appears low. She is appropriate to continue current diet of Regular textures and thin liquids with use of the standard safe swallowing precautions listed above. Patient was provided with verbal and written education on dietary and lifestyle changes for GERD management. Further Speech Therapy is not indicated at this time.     Swallow Goals   SLP Swallow Goals 1   Swallow Goal 1   Goal Identifier GERD precautions   Goal Description Patient will verbalize understanding of dietary and lifestyle changes for management of GERD symptoms.   Goal Progress Goal met   Target Date 05/02/23   Date Met 05/02/23   Total Session Time   SLP Eval: VideoFluoroscopic Swallow  function Minutes (12707) 10   Total Evaluation Time 10 minutes   Therapy Certification   Certification date from 05/02/23   Certification date to 05/02/23   Medical Diagnosis Dysphagia, unspecified type

## 2023-07-05 ENCOUNTER — TRANSFERRED RECORDS (OUTPATIENT)
Dept: HEALTH INFORMATION MANAGEMENT | Facility: CLINIC | Age: 63
End: 2023-07-05
Payer: COMMERCIAL

## 2023-07-19 ENCOUNTER — TRANSFERRED RECORDS (OUTPATIENT)
Dept: HEALTH INFORMATION MANAGEMENT | Facility: CLINIC | Age: 63
End: 2023-07-19
Payer: COMMERCIAL

## 2023-10-17 ENCOUNTER — TRANSFERRED RECORDS (OUTPATIENT)
Dept: HEALTH INFORMATION MANAGEMENT | Facility: CLINIC | Age: 63
End: 2023-10-17
Payer: COMMERCIAL

## 2024-01-03 ENCOUNTER — TRANSFERRED RECORDS (OUTPATIENT)
Dept: HEALTH INFORMATION MANAGEMENT | Facility: CLINIC | Age: 64
End: 2024-01-03
Payer: COMMERCIAL